# Patient Record
Sex: FEMALE | Race: OTHER | Employment: FULL TIME | URBAN - METROPOLITAN AREA
[De-identification: names, ages, dates, MRNs, and addresses within clinical notes are randomized per-mention and may not be internally consistent; named-entity substitution may affect disease eponyms.]

---

## 2017-03-21 ENCOUNTER — OFFICE VISIT (OUTPATIENT)
Dept: FAMILY MEDICINE CLINIC | Facility: CLINIC | Age: 32
End: 2017-03-21

## 2017-03-21 VITALS
HEART RATE: 88 BPM | OXYGEN SATURATION: 99 % | RESPIRATION RATE: 18 BRPM | DIASTOLIC BLOOD PRESSURE: 80 MMHG | SYSTOLIC BLOOD PRESSURE: 128 MMHG | TEMPERATURE: 100 F

## 2017-03-21 DIAGNOSIS — S01.20XA: Primary | ICD-10-CM

## 2017-03-21 PROCEDURE — 99213 OFFICE O/P EST LOW 20 MIN: CPT

## 2017-03-21 RX ORDER — SULFAMETHOXAZOLE AND TRIMETHOPRIM 800; 160 MG/1; MG/1
TABLET ORAL
Qty: 20 TABLET | Refills: 0 | Status: SHIPPED | OUTPATIENT
Start: 2017-03-21 | End: 2017-12-13 | Stop reason: ALTCHOICE

## 2017-03-22 ENCOUNTER — TELEPHONE (OUTPATIENT)
Dept: FAMILY MEDICINE CLINIC | Facility: CLINIC | Age: 32
End: 2017-03-22

## 2017-03-22 ENCOUNTER — TELEPHONE (OUTPATIENT)
Dept: INTERNAL MEDICINE CLINIC | Facility: CLINIC | Age: 32
End: 2017-03-22

## 2017-03-22 LAB
MRSA NASAL: POSITIVE
STAPH A BY PCR: POSITIVE

## 2017-03-22 NOTE — PROGRESS NOTES
Eryn Lofton is a 34year old female. CHIEF COMPLAINT:   Patient presents with:  Pharyngitis  Mouth Cold Sores (respiratory/integumentary)      HPI:   Patient presents with 2-3 day history of sore throat.   Patient reports the following associated symptoms: exudate. Mild post pharyng redness  NECK: supple, non-tender  LUNGS: clear to auscultation bilaterally, no wheezes or rhonchi. Breathing is non labored.   CARDIO: RRR without murmur  EXTREMITIES: no cyanosis, clubbing or edema  LYMPH: soft non tender upper

## 2017-03-22 NOTE — PATIENT INSTRUCTIONS
Use Bactroban inside your nose 3 times a day for 2 weeks. Trim nails short and shower with Hibiclens daily for next 3 days, then 3 times a weeks. Wash bed linens 2x a week for next 2 weeks. Run them through dryer.   We will treat presumptively for MRSA f

## 2017-03-22 NOTE — TELEPHONE ENCOUNTER
Contacted Nannette and related + MRSA results and to follow-up with PCM in a week. Understanding verbalized.

## 2017-03-22 NOTE — TELEPHONE ENCOUNTER
Called concerning wound culture. + MSRA. LVM to call for results. Nannette placed on Bactrim DS on visit.

## 2017-03-29 ENCOUNTER — OFFICE VISIT (OUTPATIENT)
Dept: INTERNAL MEDICINE CLINIC | Facility: CLINIC | Age: 32
End: 2017-03-29

## 2017-03-29 VITALS
RESPIRATION RATE: 18 BRPM | SYSTOLIC BLOOD PRESSURE: 110 MMHG | WEIGHT: 223.19 LBS | TEMPERATURE: 98 F | BODY MASS INDEX: 39.55 KG/M2 | HEART RATE: 74 BPM | DIASTOLIC BLOOD PRESSURE: 74 MMHG | HEIGHT: 63 IN

## 2017-03-29 DIAGNOSIS — J06.9 ACUTE URI: Primary | ICD-10-CM

## 2017-03-29 PROCEDURE — 99212 OFFICE O/P EST SF 10 MIN: CPT | Performed by: INTERNAL MEDICINE

## 2017-03-29 PROCEDURE — 99213 OFFICE O/P EST LOW 20 MIN: CPT | Performed by: INTERNAL MEDICINE

## 2017-03-29 NOTE — PROGRESS NOTES
Irena Xie is a 34year old female. Patient presents with: Other: PATIENT F/U FOR WOUND NASAL CAVITY POSITIVE MRSA. HPI:   Ms. Salud Collins presents this morning for Immediate Care follow-up. She went to Immediate Care on March 21 with a sore throat.   Hoa appearing well and breathing comfortably in no distress  /74 mmHg  Pulse 74  Temp(Src) 97.8 °F (36.6 °C) (Oral)  Resp 18  Ht 5' 3\" (1.6 m)  Wt 223 lb 3.2 oz (101.243 kg)  BMI 39.55 kg/m2  LMP 03/19/2017  HEENT: Anicteric, conjunctiva pink, canals an

## 2017-12-13 ENCOUNTER — OFFICE VISIT (OUTPATIENT)
Dept: FAMILY MEDICINE CLINIC | Facility: CLINIC | Age: 32
End: 2017-12-13

## 2017-12-13 VITALS
TEMPERATURE: 99 F | RESPIRATION RATE: 16 BRPM | OXYGEN SATURATION: 98 % | SYSTOLIC BLOOD PRESSURE: 122 MMHG | HEART RATE: 86 BPM | DIASTOLIC BLOOD PRESSURE: 78 MMHG

## 2017-12-13 DIAGNOSIS — H65.02 ACUTE SEROUS OTITIS MEDIA OF LEFT EAR, RECURRENCE NOT SPECIFIED: ICD-10-CM

## 2017-12-13 DIAGNOSIS — R59.0 CERVICAL LYMPHADENOPATHY: Primary | ICD-10-CM

## 2017-12-13 PROCEDURE — 99213 OFFICE O/P EST LOW 20 MIN: CPT | Performed by: NURSE PRACTITIONER

## 2017-12-13 RX ORDER — AMOXICILLIN AND CLAVULANATE POTASSIUM 875; 125 MG/1; MG/1
1 TABLET, FILM COATED ORAL 2 TIMES DAILY
Qty: 14 TABLET | Refills: 0 | Status: SHIPPED | OUTPATIENT
Start: 2017-12-13 | End: 2017-12-20

## 2017-12-13 NOTE — PATIENT INSTRUCTIONS
Lymphadenopathy  Lymphadenopathy is swelling of the lymph nodes. Lymph nodes are small, bean-shaped glands around the body. What are lymph nodes? Lymph nodes are part of your immune system.  These glands are found in your neck, over your clavicle, armpi You may also have symptoms from an infection causing the swollen glands. These symptoms may include fever, sore throat, body aches, or cough. Diagnosing lymphadenopathy  Your healthcare provider will ask about your health history and symptoms.  He or she w © 2904-9517 [employer] [address]. [address] [facility], [facility] [place]. All rights reserved. This information is not intended as a substitute for professional medical care. Always follow your healthcare professional's instructions.

## 2017-12-13 NOTE — PROGRESS NOTES
CHIEF COMPLAINT:   Patient presents with:  URI: ear pain, swollen glands      HPI:   Aris Iglesias is a 34year old female who presents for lymphnode swelling under the neck x3 days, this morning painful left-side lymphnode, left ear pain, and sore throat, n EARS: TM's intact, non- bulging,  No retraction, + effusion LEFT ear, bony landmarks present, EACs clear, tender to LEFT pinna on exam, no external lesions noted  NOSE: Nostrils patent, minimal clear nasal discharge, nasal mucosa mildly inflamed and edemat Lymph nodes are part of your immune system. These glands are found in your neck, over your clavicle, armpits, groin, chest, and abdomen. They act as filters for lymph fluid as it flows through your body.  Lymph fluid contains white blood cells (lymphocytes) Diagnosing lymphadenopathy  Your healthcare provider will ask about your health history and symptoms. He or she will give you a physical exam and check the areas where lymph nodes are enlarged.  Your healthcare provider will check the size and location of t The patient indicates understanding of these issues and agrees to the plan.

## 2018-01-06 DIAGNOSIS — Z30.41 ENCOUNTER FOR SURVEILLANCE OF CONTRACEPTIVE PILLS: ICD-10-CM

## 2018-01-08 RX ORDER — NORGESTIMATE AND ETHINYL ESTRADIOL 0.25-0.035
KIT ORAL
Qty: 28 TABLET | Refills: 0 | Status: SHIPPED | OUTPATIENT
Start: 2018-01-08 | End: 2018-02-06

## 2018-01-08 NOTE — TELEPHONE ENCOUNTER
One ocp pack sent to pt's pharmacy to cover pt to next annual on 2/6/18. Last annual 12/8/16.  6/2015 neg Pap and HPV.

## 2018-02-06 ENCOUNTER — OFFICE VISIT (OUTPATIENT)
Dept: OBGYN CLINIC | Facility: CLINIC | Age: 33
End: 2018-02-06

## 2018-02-06 VITALS
HEIGHT: 62 IN | WEIGHT: 242 LBS | DIASTOLIC BLOOD PRESSURE: 77 MMHG | HEART RATE: 69 BPM | SYSTOLIC BLOOD PRESSURE: 121 MMHG | BODY MASS INDEX: 44.53 KG/M2

## 2018-02-06 DIAGNOSIS — Z01.419 ENCOUNTER FOR GYNECOLOGICAL EXAMINATION WITHOUT ABNORMAL FINDING: Primary | ICD-10-CM

## 2018-02-06 DIAGNOSIS — Z30.41 ENCOUNTER FOR SURVEILLANCE OF CONTRACEPTIVE PILLS: ICD-10-CM

## 2018-02-06 PROCEDURE — 99395 PREV VISIT EST AGE 18-39: CPT | Performed by: OBSTETRICS & GYNECOLOGY

## 2018-02-06 RX ORDER — NORGESTIMATE AND ETHINYL ESTRADIOL 0.25-0.035
1 KIT ORAL
Qty: 28 TABLET | Refills: 12 | Status: SHIPPED | OUTPATIENT
Start: 2018-02-06 | End: 2019-02-12

## 2018-02-07 NOTE — PROGRESS NOTES
Well Woman Exam    HPI:  The patient is a 29yo female who presents for annual exam. She has no complaints. She is happy with OCPs. She denies history of abnormal pap smear.     Reviewed medical and surgical history below   OBSTETRICS HISTORY:  Obstetric His 12    ALLERGIES:  No Known Allergies      Review of Systems:  Constitutional:  Denies fevers and chills   Cardiovascular:  denies chest pain or palpitations  Respiratory:  denies shortness of breath  Gastrointestinal:  denies nausea, vomiting diarrhea or c ACOG encourages shared decision making with the patient and together we reach appropriate screening intervals for the individual  2. Reviewed breast self awareness   4.  Follow up in 1 year

## 2018-03-28 ENCOUNTER — NURSE TRIAGE (OUTPATIENT)
Dept: OTHER | Age: 33
End: 2018-03-28

## 2018-03-28 NOTE — TELEPHONE ENCOUNTER
Action Requested: Summary for Provider     []  Critical Lab, Recommendations Needed  [] Need Additional Advice  []   FYI    []   Need Orders  [] Need Medications Sent to Pharmacy  []  Other     SUMMARY: Spoke with patient who reports she has been having ri

## 2018-03-29 ENCOUNTER — OFFICE VISIT (OUTPATIENT)
Dept: INTERNAL MEDICINE CLINIC | Facility: CLINIC | Age: 33
End: 2018-03-29

## 2018-03-29 VITALS
TEMPERATURE: 98 F | DIASTOLIC BLOOD PRESSURE: 83 MMHG | HEART RATE: 78 BPM | WEIGHT: 243.31 LBS | HEIGHT: 63 IN | BODY MASS INDEX: 43.11 KG/M2 | SYSTOLIC BLOOD PRESSURE: 127 MMHG

## 2018-03-29 DIAGNOSIS — R07.89 RIGHT-SIDED CHEST WALL PAIN: Primary | ICD-10-CM

## 2018-03-29 PROCEDURE — 99213 OFFICE O/P EST LOW 20 MIN: CPT | Performed by: INTERNAL MEDICINE

## 2018-03-29 PROCEDURE — 99212 OFFICE O/P EST SF 10 MIN: CPT | Performed by: INTERNAL MEDICINE

## 2018-03-29 RX ORDER — NAPROXEN 500 MG/1
500 TABLET ORAL 2 TIMES DAILY WITH MEALS
Qty: 28 TABLET | Refills: 0 | Status: SHIPPED | OUTPATIENT
Start: 2018-03-29 | End: 2018-04-12

## 2018-03-29 NOTE — PROGRESS NOTES
Talha Ocampo is a 28year old female. Patient presents with:  Pain: right side pain  Arm Pain: right arm  Hair/Scalp Problem      HPI:     HPI  Patient is here for evaluation of the pain on the right chest wall. It started almost 4 weeks ago.   It is a con Smokeless tobacco: Never Used                      Alcohol use: Yes           2.0 oz/week     Glasses of wine: 4 per week     Comment: Per pt socially drink on weekends       REVIEW OF SYSTEMS:   ROS  GENERAL HEALTH: No fevers, chills, sweats, fatigue. Primary        Diagnoses and all orders for this visit:    Right-sided chest wall pain  No other muscle group involvement. No trigger point tenderness. Most likely localized muscle inflammation. Giving naproxen 500 mg twice daily for 7 days.   Avoid inte

## 2018-05-21 ENCOUNTER — TELEPHONE (OUTPATIENT)
Dept: INTERNAL MEDICINE CLINIC | Facility: CLINIC | Age: 33
End: 2018-05-21

## 2018-05-21 ENCOUNTER — OFFICE VISIT (OUTPATIENT)
Dept: INTERNAL MEDICINE CLINIC | Facility: CLINIC | Age: 33
End: 2018-05-21

## 2018-05-21 VITALS
DIASTOLIC BLOOD PRESSURE: 80 MMHG | TEMPERATURE: 98 F | SYSTOLIC BLOOD PRESSURE: 126 MMHG | WEIGHT: 240.5 LBS | HEIGHT: 63 IN | BODY MASS INDEX: 42.61 KG/M2

## 2018-05-21 DIAGNOSIS — K80.21 CALCULUS OF GALLBLADDER WITH BILIARY OBSTRUCTION BUT WITHOUT CHOLECYSTITIS: Primary | ICD-10-CM

## 2018-05-21 PROCEDURE — 99214 OFFICE O/P EST MOD 30 MIN: CPT | Performed by: INTERNAL MEDICINE

## 2018-05-21 PROCEDURE — 99212 OFFICE O/P EST SF 10 MIN: CPT | Performed by: INTERNAL MEDICINE

## 2018-05-21 NOTE — PROGRESS NOTES
Clare Chappell is a 28year old female. Patient presents with:  ER F/U: pt went to [facility] and diagnosed with gall stones      HPI:     HPI   Reynaldo Castaneda is here today for ER follow up oon 5/19 from [facility] for cholelithiasis.  CT scan evidenced cholelithi weekends       REVIEW OF SYSTEMS:   Review of Systems   Constitutional: Negative for chills, fever and malaise/fatigue. Eyes: Negative. Respiratory: Negative. Cardiovascular: Negative.     Gastrointestinal: Negative for abdominal pain, blood in stoo discuss with the surgeon about elective laparoscopic cholecystectomy for symptomatic gallbladder.  -     US GALLBLADDER (BDG=[id number]); Future  -     SURGERY - INTERNAL            The patient indicates understanding of these issues and agrees to the plan.

## 2018-05-21 NOTE — TELEPHONE ENCOUNTER
Per Dr. Bennett Zee, called patient to offer appt for Wednesday 05/23/18 at 12:40. Patient stated that she has already been seen this morning for immediate issue that she had concerns about. She will keep her June appt with Dr. Bennett Zee.

## 2018-05-21 NOTE — PATIENT INSTRUCTIONS
Gallstones with Biliary Colic    You have abdominal pain due to irritation and spasm of the gallbladder. This is called biliary colic. The gallbladder is a small sac under the liver, which stores and releases a fluid that aids in the digestion of fat. A © 1966-1451 [employer] [address]. [address] [facility], [employer] [place]. All rights reserved. This information is not intended as a substitute for professional medical care. Always follow your healthcare professional's instructions.         [place] · Fever of 100.4ºF (38ºC) or higher, or as directed by your healthcare provider  · Very dark urine, light colored stools, or yellow color of the skin or eyes  · Chest, arm, back, neck or jaw pain  Date Last Reviewed: 6/18/2015  © 1318-0591 [employer]

## 2018-05-23 ENCOUNTER — OFFICE VISIT (OUTPATIENT)
Dept: SURGERY | Facility: CLINIC | Age: 33
End: 2018-05-23

## 2018-05-23 VITALS — BODY MASS INDEX: 42.52 KG/M2 | HEIGHT: 63 IN | WEIGHT: 240 LBS

## 2018-05-23 DIAGNOSIS — K80.20 CALCULUS OF GALLBLADDER WITHOUT CHOLECYSTITIS WITHOUT OBSTRUCTION: Primary | ICD-10-CM

## 2018-05-23 PROCEDURE — 99212 OFFICE O/P EST SF 10 MIN: CPT | Performed by: SURGERY

## 2018-05-23 PROCEDURE — 99204 OFFICE O/P NEW MOD 45 MIN: CPT | Performed by: SURGERY

## 2018-05-23 RX ORDER — TRAMADOL HYDROCHLORIDE 50 MG/1
50 TABLET ORAL
COMMUNITY
Start: 2018-05-20 | End: 2018-05-23

## 2018-05-23 NOTE — PROGRESS NOTES
HPI:    Patient ID: Italia Hodges is a 28year old female presenting with Patient presents with:  Gallbladder: Patient referred by PCP Dr. Masoud Reyes for gallstones found at [facility] via CT scan, ER on Saturday 05/19, experienced epigastric pain along with na nausea, vomiting and abdominal pain. Endocrine: Negative. Genitourinary: Negative. Musculoskeletal: Negative. Skin: Negative. Allergic/Immunologic: Negative. Neurological: Negative. Hematological: Does not bruise/bleed easily.    Psychia

## 2018-05-25 ENCOUNTER — TELEPHONE (OUTPATIENT)
Dept: ADMINISTRATIVE | Age: 33
End: 2018-05-25

## 2018-05-25 NOTE — TELEPHONE ENCOUNTER
Dropped off by pt @[facility] Disability form for Dr. Rajni Roque + FCR+ Signed release, paid $25. Logged for processing.  NK

## 2018-05-29 NOTE — TELEPHONE ENCOUNTER
Dr. Loraine Khan,    Please sign off on form:  -Highlight the patient and hit \"Chart\" button. -In Chart Review, w/in the Encounter tab - click 1 time on the Telephone call encounter for 05/25/18.  Scroll down the telephone encounter.  -Click \"scan on\" blue Hype

## 2018-05-30 NOTE — TELEPHONE ENCOUNTER
Form faxed and mailed to [facility], copy mailed to ptshayla for pt to let her know it is completed.  NK

## 2018-05-31 RX ORDER — OXICONAZOLE NITRATE 10 MG/G
CREAM TOPICAL 2 TIMES DAILY
COMMUNITY
End: 2018-08-25 | Stop reason: ALTCHOICE

## 2018-06-07 ENCOUNTER — ANESTHESIA EVENT (OUTPATIENT)
Dept: SURGERY | Facility: HOSPITAL | Age: 33
End: 2018-06-07
Payer: COMMERCIAL

## 2018-06-07 ENCOUNTER — ANESTHESIA (OUTPATIENT)
Dept: SURGERY | Facility: HOSPITAL | Age: 33
End: 2018-06-07
Payer: COMMERCIAL

## 2018-06-07 ENCOUNTER — SURGERY (OUTPATIENT)
Age: 33
End: 2018-06-07

## 2018-06-07 ENCOUNTER — HOSPITAL ENCOUNTER (OUTPATIENT)
Facility: HOSPITAL | Age: 33
Setting detail: HOSPITAL OUTPATIENT SURGERY
Discharge: HOME | End: 2018-06-07
Attending: SURGERY | Admitting: SURGERY
Payer: COMMERCIAL

## 2018-06-07 VITALS
HEIGHT: 63 IN | RESPIRATION RATE: 16 BRPM | HEART RATE: 68 BPM | DIASTOLIC BLOOD PRESSURE: 57 MMHG | SYSTOLIC BLOOD PRESSURE: 120 MMHG | WEIGHT: 235 LBS | OXYGEN SATURATION: 96 % | BODY MASS INDEX: 41.64 KG/M2 | TEMPERATURE: 98 F

## 2018-06-07 DIAGNOSIS — K80.20 CALCULUS OF GALLBLADDER WITHOUT CHOLECYSTITIS WITHOUT OBSTRUCTION: ICD-10-CM

## 2018-06-07 DIAGNOSIS — K80.20 GALLSTONES: ICD-10-CM

## 2018-06-07 PROCEDURE — 0FT44ZZ RESECTION OF GALLBLADDER, PERCUTANEOUS ENDOSCOPIC APPROACH: ICD-10-PCS | Performed by: SURGERY

## 2018-06-07 PROCEDURE — 47562 LAPAROSCOPIC CHOLECYSTECTOMY: CPT | Performed by: SURGERY

## 2018-06-07 RX ORDER — MIDAZOLAM HYDROCHLORIDE 1 MG/ML
INJECTION INTRAMUSCULAR; INTRAVENOUS AS NEEDED
Status: DISCONTINUED | OUTPATIENT
Start: 2018-06-07 | End: 2018-06-07 | Stop reason: SURG

## 2018-06-07 RX ORDER — HYDROCODONE BITARTRATE AND ACETAMINOPHEN 10; 325 MG/1; MG/1
1 TABLET ORAL ONCE AS NEEDED
Status: COMPLETED | OUTPATIENT
Start: 2018-06-07 | End: 2018-06-07

## 2018-06-07 RX ORDER — GLYCOPYRROLATE 0.2 MG/ML
INJECTION INTRAMUSCULAR; INTRAVENOUS AS NEEDED
Status: DISCONTINUED | OUTPATIENT
Start: 2018-06-07 | End: 2018-06-07 | Stop reason: SURG

## 2018-06-07 RX ORDER — ONDANSETRON 2 MG/ML
INJECTION INTRAMUSCULAR; INTRAVENOUS AS NEEDED
Status: DISCONTINUED | OUTPATIENT
Start: 2018-06-07 | End: 2018-06-07 | Stop reason: SURG

## 2018-06-07 RX ORDER — HYDROMORPHONE HYDROCHLORIDE 1 MG/ML
0.2 INJECTION, SOLUTION INTRAMUSCULAR; INTRAVENOUS; SUBCUTANEOUS EVERY 5 MIN PRN
Status: DISCONTINUED | OUTPATIENT
Start: 2018-06-07 | End: 2018-06-07

## 2018-06-07 RX ORDER — HYDROMORPHONE HYDROCHLORIDE 1 MG/ML
0.6 INJECTION, SOLUTION INTRAMUSCULAR; INTRAVENOUS; SUBCUTANEOUS EVERY 5 MIN PRN
Status: DISCONTINUED | OUTPATIENT
Start: 2018-06-07 | End: 2018-06-07

## 2018-06-07 RX ORDER — DOCUSATE SODIUM 100 MG/1
100 CAPSULE, LIQUID FILLED ORAL 3 TIMES DAILY
Qty: 30 CAPSULE | Refills: 2 | Status: SHIPPED | OUTPATIENT
Start: 2018-06-07 | End: 2018-06-26

## 2018-06-07 RX ORDER — KETOROLAC TROMETHAMINE 30 MG/ML
INJECTION, SOLUTION INTRAMUSCULAR; INTRAVENOUS AS NEEDED
Status: DISCONTINUED | OUTPATIENT
Start: 2018-06-07 | End: 2018-06-07 | Stop reason: SURG

## 2018-06-07 RX ORDER — HYDROCODONE BITARTRATE AND ACETAMINOPHEN 5; 325 MG/1; MG/1
2 TABLET ORAL AS NEEDED
Status: DISCONTINUED | OUTPATIENT
Start: 2018-06-07 | End: 2018-06-07

## 2018-06-07 RX ORDER — SODIUM CHLORIDE, SODIUM LACTATE, POTASSIUM CHLORIDE, CALCIUM CHLORIDE 600; 310; 30; 20 MG/100ML; MG/100ML; MG/100ML; MG/100ML
INJECTION, SOLUTION INTRAVENOUS CONTINUOUS
Status: DISCONTINUED | OUTPATIENT
Start: 2018-06-07 | End: 2018-06-07

## 2018-06-07 RX ORDER — SCOLOPAMINE TRANSDERMAL SYSTEM 1 MG/1
PATCH, EXTENDED RELEASE TRANSDERMAL AS NEEDED
Status: DISCONTINUED | OUTPATIENT
Start: 2018-06-07 | End: 2018-06-07 | Stop reason: SURG

## 2018-06-07 RX ORDER — HALOPERIDOL 5 MG/ML
0.25 INJECTION INTRAMUSCULAR ONCE AS NEEDED
Status: DISCONTINUED | OUTPATIENT
Start: 2018-06-07 | End: 2018-06-07

## 2018-06-07 RX ORDER — ONDANSETRON 2 MG/ML
4 INJECTION INTRAMUSCULAR; INTRAVENOUS ONCE AS NEEDED
Status: DISCONTINUED | OUTPATIENT
Start: 2018-06-07 | End: 2018-06-07

## 2018-06-07 RX ORDER — BUPIVACAINE HYDROCHLORIDE 5 MG/ML
INJECTION, SOLUTION EPIDURAL; INTRACAUDAL AS NEEDED
Status: DISCONTINUED | OUTPATIENT
Start: 2018-06-07 | End: 2018-06-07 | Stop reason: HOSPADM

## 2018-06-07 RX ORDER — HYDROCODONE BITARTRATE AND ACETAMINOPHEN 10; 325 MG/1; MG/1
1 TABLET ORAL EVERY 4 HOURS PRN
Qty: 30 TABLET | Refills: 0 | Status: SHIPPED | OUTPATIENT
Start: 2018-06-07 | End: 2018-06-26

## 2018-06-07 RX ORDER — FAMOTIDINE 20 MG/1
20 TABLET ORAL ONCE
Status: DISCONTINUED | OUTPATIENT
Start: 2018-06-07 | End: 2018-06-07 | Stop reason: HOSPADM

## 2018-06-07 RX ORDER — ACETAMINOPHEN 500 MG
1000 TABLET ORAL ONCE
Status: DISCONTINUED | OUTPATIENT
Start: 2018-06-07 | End: 2018-06-07 | Stop reason: HOSPADM

## 2018-06-07 RX ORDER — HYDROMORPHONE HYDROCHLORIDE 1 MG/ML
0.4 INJECTION, SOLUTION INTRAMUSCULAR; INTRAVENOUS; SUBCUTANEOUS EVERY 5 MIN PRN
Status: DISCONTINUED | OUTPATIENT
Start: 2018-06-07 | End: 2018-06-07

## 2018-06-07 RX ORDER — CEFAZOLIN SODIUM/WATER 2 G/20 ML
2 SYRINGE (ML) INTRAVENOUS ONCE
Status: COMPLETED | OUTPATIENT
Start: 2018-06-07 | End: 2018-06-07

## 2018-06-07 RX ORDER — ROCURONIUM BROMIDE 10 MG/ML
INJECTION, SOLUTION INTRAVENOUS AS NEEDED
Status: DISCONTINUED | OUTPATIENT
Start: 2018-06-07 | End: 2018-06-07 | Stop reason: SURG

## 2018-06-07 RX ORDER — HYDROCODONE BITARTRATE AND ACETAMINOPHEN 5; 325 MG/1; MG/1
1 TABLET ORAL AS NEEDED
Status: DISCONTINUED | OUTPATIENT
Start: 2018-06-07 | End: 2018-06-07

## 2018-06-07 RX ORDER — NALOXONE HYDROCHLORIDE 0.4 MG/ML
80 INJECTION, SOLUTION INTRAMUSCULAR; INTRAVENOUS; SUBCUTANEOUS AS NEEDED
Status: DISCONTINUED | OUTPATIENT
Start: 2018-06-07 | End: 2018-06-07

## 2018-06-07 RX ORDER — METOCLOPRAMIDE 10 MG/1
10 TABLET ORAL ONCE
Status: DISCONTINUED | OUTPATIENT
Start: 2018-06-07 | End: 2018-06-07 | Stop reason: HOSPADM

## 2018-06-07 RX ORDER — DEXAMETHASONE SODIUM PHOSPHATE 4 MG/ML
VIAL (ML) INJECTION AS NEEDED
Status: DISCONTINUED | OUTPATIENT
Start: 2018-06-07 | End: 2018-06-07 | Stop reason: SURG

## 2018-06-07 RX ORDER — LIDOCAINE HYDROCHLORIDE 10 MG/ML
INJECTION, SOLUTION EPIDURAL; INFILTRATION; INTRACAUDAL; PERINEURAL AS NEEDED
Status: DISCONTINUED | OUTPATIENT
Start: 2018-06-07 | End: 2018-06-07 | Stop reason: SURG

## 2018-06-07 RX ORDER — NEOSTIGMINE METHYLSULFATE 0.5 MG/ML
INJECTION INTRAVENOUS AS NEEDED
Status: DISCONTINUED | OUTPATIENT
Start: 2018-06-07 | End: 2018-06-07 | Stop reason: SURG

## 2018-06-07 RX ADMIN — GLYCOPYRROLATE 0.6 MG: 0.2 INJECTION INTRAMUSCULAR; INTRAVENOUS at 15:56:00

## 2018-06-07 RX ADMIN — LIDOCAINE HYDROCHLORIDE 50 MG: 10 INJECTION, SOLUTION EPIDURAL; INFILTRATION; INTRACAUDAL; PERINEURAL at 14:57:00

## 2018-06-07 RX ADMIN — MIDAZOLAM HYDROCHLORIDE 2 MG: 1 INJECTION INTRAMUSCULAR; INTRAVENOUS at 14:54:00

## 2018-06-07 RX ADMIN — ROCURONIUM BROMIDE 10 MG: 10 INJECTION, SOLUTION INTRAVENOUS at 15:15:00

## 2018-06-07 RX ADMIN — SCOLOPAMINE TRANSDERMAL SYSTEM 1 PATCH: 1 PATCH, EXTENDED RELEASE TRANSDERMAL at 14:50:00

## 2018-06-07 RX ADMIN — DEXAMETHASONE SODIUM PHOSPHATE 4 MG: 4 MG/ML VIAL (ML) INJECTION at 15:15:00

## 2018-06-07 RX ADMIN — SODIUM CHLORIDE, SODIUM LACTATE, POTASSIUM CHLORIDE, CALCIUM CHLORIDE: 600; 310; 30; 20 INJECTION, SOLUTION INTRAVENOUS at 15:38:00

## 2018-06-07 RX ADMIN — NEOSTIGMINE METHYLSULFATE 3 MG: 0.5 INJECTION INTRAVENOUS at 15:56:00

## 2018-06-07 RX ADMIN — CEFAZOLIN SODIUM/WATER 2 G: 2 G/20 ML SYRINGE (ML) INTRAVENOUS at 15:08:00

## 2018-06-07 RX ADMIN — KETOROLAC TROMETHAMINE 30 MG: 30 INJECTION, SOLUTION INTRAMUSCULAR; INTRAVENOUS at 15:56:00

## 2018-06-07 RX ADMIN — ONDANSETRON 4 MG: 2 INJECTION INTRAMUSCULAR; INTRAVENOUS at 15:15:00

## 2018-06-07 RX ADMIN — SODIUM CHLORIDE, SODIUM LACTATE, POTASSIUM CHLORIDE, CALCIUM CHLORIDE: 600; 310; 30; 20 INJECTION, SOLUTION INTRAVENOUS at 14:52:00

## 2018-06-07 RX ADMIN — ROCURONIUM BROMIDE 40 MG: 10 INJECTION, SOLUTION INTRAVENOUS at 14:58:00

## 2018-06-07 NOTE — ANESTHESIA PREPROCEDURE EVALUATION
Anesthesia PreOp Note    HPI:     Sylvain Reyes is a 28year old female who presents for preoperative consultation requested by: Nicci Malave MD    Date of Surgery: 6/7/2018    Procedure(s):  LAPAROSCOPIC CHOLECYSTECTOMY  Indication: Gallstones [K80.20]    R famoTIDine (PEPCID) tab 20 mg 20 mg Oral Once Laura Running, MD   Metoclopramide HCl (REGLAN) tab 10 mg 10 mg Oral Once Laura Running, MD   ceFAZolin sodium (ANCEF/KEFZOL) 2 GM/20ML premix IV syringe 2 g 2 g Intravenous Once Laura Running, MD     No current Epic-order Neck ROM: full  Dental          Pulmonary - negative ROS and normal exam   Cardiovascular - normal exam  Exercise tolerance: good    Neuro/Psych - negative ROS     GI/Hepatic/Renal    (-) GERD    Endo/Other - negative ROS   Abdominal   (+) obese,

## 2018-06-07 NOTE — ANESTHESIA POSTPROCEDURE EVALUATION
Patient: Chelsea Villavicencio    Procedure Summary     Date:  06/07/18 Room / Location:  [facility] OR 03 / [facility] OR    Anesthesia Start:  1985 Anesthesia Stop:  1484    Procedure:  LAPAROSCOPIC CHOLECYSTECTOMY (N/A Abdomen) Diagnosis:       Gallstones      (Gal

## 2018-06-07 NOTE — OPERATIVE REPORT
Operative Report    Patient Name:  Leonel Osborne  MR:  [id number]  :  3/17/1985  DOS:  17    Preop Dx:  Gallstones [K80.20]  Postop Dx:  Chronic cholecystitis  Procedure:  Laparoscopic cholecystectomy  Surgeon:  Shanel Lord MD  Surgical Assistant.: duct and artery, are seen entering the infundibulum, thus obtaining the so called \"critical view of safety\". The cystic duct and artery were clipped and divided.   The gallbladder was detached from the liver using hook cautery and delivered from the abdo

## 2018-06-08 ENCOUNTER — TELEPHONE (OUTPATIENT)
Dept: OBGYN CLINIC | Facility: CLINIC | Age: 33
End: 2018-06-08

## 2018-06-08 NOTE — H&P
HP Update:    Pt seen and examined in the preoperative holding area. No significant clinical update noted. Plan to proceed with a lap tyrone.       Blanche Schwab MD

## 2018-06-08 NOTE — TELEPHONE ENCOUNTER
Pt had gall bladder removed yesterday and had a CT scan that showed an ovarian cyst. She wants to f/u with [facility]. Advised her to wait until she has had her post op appt so she has time to heal first. Pt agrees. She accepted an appt end of June with Natasha Mortimer.  We montserrat

## 2018-06-08 NOTE — TELEPHONE ENCOUNTER
From [facility] via fax - request for records update, op report faxed to Ins, scanned, mailed to pt, no addl charge.  NK

## 2018-06-11 ENCOUNTER — TELEPHONE (OUTPATIENT)
Dept: SURGERY | Facility: CLINIC | Age: 33
End: 2018-06-11

## 2018-06-11 NOTE — TELEPHONE ENCOUNTER
Patient would like to know when the post op pain will start to subside. States the larger incision is still pretty painful -- rates pain at 4/5 out of 10. Wants to know if this is normal. Please call. Thank you.

## 2018-06-11 NOTE — TELEPHONE ENCOUNTER
Contacted patient. S/P Laparoscopic cholecystectomy on 06/07/2018. Patient c/o pain, discomfort, and tightness to umbilical incision 4/5 out of 10. Was taking norco until yesterday and has not taken anything this AM. Denies fever, no drainage or bleeding.

## 2018-06-22 ENCOUNTER — OFFICE VISIT (OUTPATIENT)
Dept: SURGERY | Facility: CLINIC | Age: 33
End: 2018-06-22

## 2018-06-22 DIAGNOSIS — Z90.49 S/P LAPAROSCOPIC CHOLECYSTECTOMY: Primary | ICD-10-CM

## 2018-06-22 PROCEDURE — 99212 OFFICE O/P EST SF 10 MIN: CPT | Performed by: SURGERY

## 2018-06-22 PROCEDURE — 99024 POSTOP FOLLOW-UP VISIT: CPT | Performed by: SURGERY

## 2018-06-22 NOTE — PROGRESS NOTES
S:  Nova Grimes is a 28year old female sp Lap Opal  Doing well, tolerating a general diet with normal bowel habits      O:  LMP 05/23/2018   GEN:  No acute distress  Abd:  Soft, NTND, incisions C/D/I    Path:  Reviewed w pt    Assessment   S/P laparosc

## 2018-06-26 ENCOUNTER — OFFICE VISIT (OUTPATIENT)
Dept: OBGYN CLINIC | Facility: CLINIC | Age: 33
End: 2018-06-26

## 2018-06-26 VITALS
BODY MASS INDEX: 42 KG/M2 | WEIGHT: 238.38 LBS | DIASTOLIC BLOOD PRESSURE: 74 MMHG | HEART RATE: 62 BPM | SYSTOLIC BLOOD PRESSURE: 113 MMHG

## 2018-06-26 DIAGNOSIS — Z87.42 HISTORY OF OVARIAN CYST: Primary | ICD-10-CM

## 2018-06-26 PROCEDURE — 99213 OFFICE O/P EST LOW 20 MIN: CPT | Performed by: OBSTETRICS & GYNECOLOGY

## 2018-06-26 NOTE — PROGRESS NOTES
Pa Boyce is a 28year old female [facility] Patient's last menstrual period was 06/20/2018. Patient presents with:  Gyn Problem: test result consult  Patient presents today for follow up [place] [facility] [place] showing left ovarian cyst (4cm). Norgestimate-Eth Estradiol (MONO-LINYAH) 0.25-35 MG-MCG Oral Tab, Take 1 tablet by mouth once daily.  (Patient taking differently: Take 1 tablet by mouth nightly.  ), Disp: 28 tablet, Rfl: 12  •  Oxiconazole Nitrate 1 % External Cream, Apply topically 2 (tw [address]

## 2018-06-27 ENCOUNTER — OFFICE VISIT (OUTPATIENT)
Dept: INTERNAL MEDICINE CLINIC | Facility: CLINIC | Age: 33
End: 2018-06-27

## 2018-06-27 VITALS
TEMPERATURE: 99 F | OXYGEN SATURATION: 99 % | HEART RATE: 71 BPM | BODY MASS INDEX: 42.24 KG/M2 | WEIGHT: 238.38 LBS | SYSTOLIC BLOOD PRESSURE: 124 MMHG | HEIGHT: 63 IN | DIASTOLIC BLOOD PRESSURE: 76 MMHG

## 2018-06-27 DIAGNOSIS — Z90.49 S/P LAPAROSCOPIC CHOLECYSTECTOMY: ICD-10-CM

## 2018-06-27 DIAGNOSIS — Z09 HOSPITAL DISCHARGE FOLLOW-UP: ICD-10-CM

## 2018-06-27 DIAGNOSIS — E66.01 MORBID OBESITY (HCC): ICD-10-CM

## 2018-06-27 DIAGNOSIS — Z51.81 THERAPEUTIC DRUG MONITORING: Primary | ICD-10-CM

## 2018-06-27 PROCEDURE — 99215 OFFICE O/P EST HI 40 MIN: CPT | Performed by: INTERNAL MEDICINE

## 2018-06-27 RX ORDER — PHENTERMINE HYDROCHLORIDE 15 MG/1
15 CAPSULE ORAL EVERY MORNING
Qty: 30 CAPSULE | Refills: 0 | Status: SHIPPED | OUTPATIENT
Start: 2018-06-27 | End: 2018-09-22 | Stop reason: ALTCHOICE

## 2018-06-27 NOTE — PATIENT INSTRUCTIONS
HERI Amayacome to [email or web address]. We are excited that you are committed to improving your health and have invited our practice to be part of your journey.  Our approach to the medical management of weight loss is similar to that of other chr water per day, add fiber ( benefiber) to the water to increase fullness, overcome constipation    · Eat slowly    · Do not drink your calories ( no regular pop, juice, high calorie coffee drinks, limit alcohol) Also stay away from artificially sweetened be should I use this medicine? Take this medicine by mouth with a glass of water. Follow the directions on the prescription label. The instructions for use may differ based on the product and dose you are taking. Avoid taking this medicine in the evening.  It it is almost time for your next dose, take only that dose. Do not take double or extra doses. Where should I keep my medicine? Keep out of the reach of children. This medicine can be abused. Keep your medicine in a safe place to protect it from theft.  Do weight. Do not increase or in any way change your dose without consulting your doctor. You may get drowsy or dizzy. Do not drive, use machinery, or do anything that needs mental alertness until you know how this medicine affects you.  Do not stand or sit u

## 2018-06-27 NOTE — PROGRESS NOTES
Mara Verdugo is a 28year old female. Chief complaint:  weight loss management and obesity related co morbidities, establish care and follow up on gall bladder surgery     HPI:     Mara Verdugo is a 28year old female new to our office today.   with PMH topically 2 (two) times daily. Disp:  Rfl:    Norgestimate-Eth Estradiol (MONO-LINYAH) 0.25-35 MG-MCG Oral Tab Take 1 tablet by mouth once daily.  (Patient taking differently: Take 1 tablet by mouth nightly.  ) Disp: 28 tablet Rfl: 12     Past Medical Histo lb  05/23/18 : 240 lb  05/21/18 : 240 lb 8 oz  03/29/18 : 243 lb 4.8 oz       GENERAL: well developed, well nourished,in no apparent distress  SKIN: no rashes,no suspicious lesions  HEENT: atraumatic, normocephalic,ears and throat are clear  NECK: supple,n 3 times per week ( goal is 150 min/week)  -dietician referral: no   -Labs as ordered        Follow up with PCP as scheduled. Follow up with Adalgisa Jamesch 1 mos      I spent 45 minutes face to face with the patient.   More than 50% of that time was spent c

## 2018-06-30 ENCOUNTER — LAB ENCOUNTER (OUTPATIENT)
Dept: LAB | Facility: HOSPITAL | Age: 33
End: 2018-06-30
Attending: INTERNAL MEDICINE
Payer: COMMERCIAL

## 2018-06-30 DIAGNOSIS — Z51.81 THERAPEUTIC DRUG MONITORING: ICD-10-CM

## 2018-06-30 LAB
ALBUMIN SERPL BCP-MCNC: 4 G/DL (ref 3.5–4.8)
ALBUMIN/GLOB SERPL: 1.4 {RATIO} (ref 1–2)
ALP SERPL-CCNC: 55 U/L (ref 32–100)
ALT SERPL-CCNC: 27 U/L (ref 14–54)
ANION GAP SERPL CALC-SCNC: 7 MMOL/L (ref 0–18)
AST SERPL-CCNC: 26 U/L (ref 15–41)
BASOPHILS # BLD: 0 K/UL (ref 0–0.2)
BASOPHILS NFR BLD: 1 %
BILIRUB SERPL-MCNC: 0.6 MG/DL (ref 0.3–1.2)
BUN SERPL-MCNC: 9 MG/DL (ref 8–20)
BUN/CREAT SERPL: 13.8 (ref 10–20)
CALCIUM SERPL-MCNC: 9 MG/DL (ref 8.5–10.5)
CHLORIDE SERPL-SCNC: 105 MMOL/L (ref 95–110)
CHOLEST SERPL-MCNC: 163 MG/DL (ref 110–200)
CO2 SERPL-SCNC: 24 MMOL/L (ref 22–32)
CREAT SERPL-MCNC: 0.65 MG/DL (ref 0.5–1.5)
EOSINOPHIL # BLD: 0 K/UL (ref 0–0.7)
EOSINOPHIL NFR BLD: 1 %
ERYTHROCYTE [DISTWIDTH] IN BLOOD BY AUTOMATED COUNT: 13.4 % (ref 11–15)
GLOBULIN PLAS-MCNC: 2.8 G/DL (ref 2.5–3.7)
GLUCOSE SERPL-MCNC: 83 MG/DL (ref 70–99)
HBA1C MFR BLD: 5.1 % (ref 4–6)
HCT VFR BLD AUTO: 40.6 % (ref 35–48)
HDLC SERPL-MCNC: 70 MG/DL
HGB BLD-MCNC: 13.7 G/DL (ref 12–16)
LDLC SERPL CALC-MCNC: 84 MG/DL (ref 0–99)
LYMPHOCYTES # BLD: 1.6 K/UL (ref 1–4)
LYMPHOCYTES NFR BLD: 35 %
MCH RBC QN AUTO: 31.1 PG (ref 27–32)
MCHC RBC AUTO-ENTMCNC: 33.7 G/DL (ref 32–37)
MCV RBC AUTO: 92.2 FL (ref 80–100)
MONOCYTES # BLD: 0.3 K/UL (ref 0–1)
MONOCYTES NFR BLD: 7 %
NEUTROPHILS # BLD AUTO: 2.6 K/UL (ref 1.8–7.7)
NEUTROPHILS NFR BLD: 57 %
NONHDLC SERPL-MCNC: 93 MG/DL
OSMOLALITY UR CALC.SUM OF ELEC: 280 MOSM/KG (ref 275–295)
PATIENT FASTING: YES
PLATELET # BLD AUTO: 224 K/UL (ref 140–400)
PMV BLD AUTO: 8.9 FL (ref 7.4–10.3)
POTASSIUM SERPL-SCNC: 4.6 MMOL/L (ref 3.3–5.1)
PROT SERPL-MCNC: 6.8 G/DL (ref 5.9–8.4)
RBC # BLD AUTO: 4.4 M/UL (ref 3.7–5.4)
SODIUM SERPL-SCNC: 136 MMOL/L (ref 136–144)
TRIGL SERPL-MCNC: 44 MG/DL (ref 1–149)
TSH SERPL-ACNC: 1.84 UIU/ML (ref 0.45–5.33)
VIT B12 SERPL-MCNC: 252 PG/ML (ref 181–914)
WBC # BLD AUTO: 4.6 K/UL (ref 4–11)

## 2018-06-30 PROCEDURE — 36415 COLL VENOUS BLD VENIPUNCTURE: CPT

## 2018-06-30 PROCEDURE — 85025 COMPLETE CBC W/AUTO DIFF WBC: CPT

## 2018-06-30 PROCEDURE — 80053 COMPREHEN METABOLIC PANEL: CPT

## 2018-06-30 PROCEDURE — 83036 HEMOGLOBIN GLYCOSYLATED A1C: CPT

## 2018-06-30 PROCEDURE — 84443 ASSAY THYROID STIM HORMONE: CPT

## 2018-06-30 PROCEDURE — 82306 VITAMIN D 25 HYDROXY: CPT

## 2018-06-30 PROCEDURE — 82607 VITAMIN B-12: CPT

## 2018-06-30 PROCEDURE — 80061 LIPID PANEL: CPT

## 2018-07-02 ENCOUNTER — TELEPHONE (OUTPATIENT)
Dept: SURGERY | Facility: CLINIC | Age: 33
End: 2018-07-02

## 2018-07-02 LAB — 25(OH)D3 SERPL-MCNC: 15.8 NG/ML

## 2018-07-02 NOTE — TELEPHONE ENCOUNTER
\"No precert required\" per Dalton Pittman at [facility] for procedure on 06/07/2018, reference [phone].

## 2018-07-21 ENCOUNTER — HOSPITAL ENCOUNTER (OUTPATIENT)
Dept: ULTRASOUND IMAGING | Facility: HOSPITAL | Age: 33
Discharge: HOME | End: 2018-07-21
Attending: OBSTETRICS & GYNECOLOGY
Payer: COMMERCIAL

## 2018-07-21 DIAGNOSIS — Z87.42 HISTORY OF OVARIAN CYST: ICD-10-CM

## 2018-07-21 PROCEDURE — 76830 TRANSVAGINAL US NON-OB: CPT | Performed by: OBSTETRICS & GYNECOLOGY

## 2018-07-21 PROCEDURE — 76856 US EXAM PELVIC COMPLETE: CPT | Performed by: OBSTETRICS & GYNECOLOGY

## 2018-07-23 ENCOUNTER — OFFICE VISIT (OUTPATIENT)
Dept: INTERNAL MEDICINE CLINIC | Facility: CLINIC | Age: 33
End: 2018-07-23
Payer: COMMERCIAL

## 2018-07-23 VITALS
HEIGHT: 63 IN | HEART RATE: 78 BPM | WEIGHT: 235 LBS | TEMPERATURE: 99 F | DIASTOLIC BLOOD PRESSURE: 80 MMHG | BODY MASS INDEX: 41.64 KG/M2 | OXYGEN SATURATION: 100 % | RESPIRATION RATE: 17 BRPM | SYSTOLIC BLOOD PRESSURE: 126 MMHG

## 2018-07-23 DIAGNOSIS — E66.01 MORBID OBESITY (HCC): ICD-10-CM

## 2018-07-23 DIAGNOSIS — Z51.81 THERAPEUTIC DRUG MONITORING: Primary | ICD-10-CM

## 2018-07-23 PROCEDURE — 99213 OFFICE O/P EST LOW 20 MIN: CPT | Performed by: INTERNAL MEDICINE

## 2018-07-23 RX ORDER — PHENTERMINE HYDROCHLORIDE 37.5 MG/1
37.5 TABLET ORAL
Qty: 30 TABLET | Refills: 0 | Status: SHIPPED | OUTPATIENT
Start: 2018-07-23 | End: 2018-08-22

## 2018-07-23 NOTE — PATIENT INSTRUCTIONS
Build Muscle & Lose Fat  The great fat vs muscle diagram below paints a clear picture of why it’s so important for you to build muscle in order to lose fat.   Maybe Rin Kessler wondered about muscle vs fat and why you need to build muscle to lose fat, look slim 3. Build confidence. Strong muscles and joints increase your level of confidence in your abilities to perform many lifestyle activities. 4. Prevent injury.  Strength training can build stronger muscles and more limber, flexible joints, which play a crucial

## 2018-07-23 NOTE — PROGRESS NOTES
Governor Lanes is a 28year old female.     Chief complaint:weight loss follow up , medication refill    HPI:   HERI here for follow up on weight loss   Wt Readings from Last 12 Encounters:  06/27/18 : 238 lb 6.4 oz  06/26/18 : 238 lb 6.4 oz  06/07/18 : 235 Positive BRENDAN (antinuclear antibody)    • Rectal bleeding    • Seborrhea    • Visual impairment     glasses/contacts     Past Surgical History:  2014: COLONOSCOPY      Comment: diverticulitis  06/07/2018: LAPAROSCOPIC CHOLECYSTECTOMY     Social History:  [place] since LOV 1 month ago. Patient has lost a total weight loss of  3lbs # since first weight loss consult. Labs reviewed  · Increase phentermine to 37.5   · Continue low carb high protein diet   · Advised to increase exercise         Plan:  1.  Nutrition: low

## 2018-07-25 ENCOUNTER — TELEPHONE (OUTPATIENT)
Dept: OBGYN CLINIC | Facility: CLINIC | Age: 33
End: 2018-07-25

## 2018-07-25 NOTE — TELEPHONE ENCOUNTER
----- Message from Leta Grant MD sent at 7/25/2018  8:03 AM CDT -----  Please let patient know that her US results were normal. She has a small 1cm follicle on her right ovary. This is normal with ovulation.

## 2018-08-25 ENCOUNTER — OFFICE VISIT (OUTPATIENT)
Dept: INTERNAL MEDICINE CLINIC | Facility: CLINIC | Age: 33
End: 2018-08-25
Payer: COMMERCIAL

## 2018-08-25 VITALS
OXYGEN SATURATION: 99 % | WEIGHT: 228 LBS | TEMPERATURE: 99 F | BODY MASS INDEX: 40.4 KG/M2 | SYSTOLIC BLOOD PRESSURE: 114 MMHG | DIASTOLIC BLOOD PRESSURE: 72 MMHG | HEIGHT: 63 IN | RESPIRATION RATE: 17 BRPM | HEART RATE: 78 BPM

## 2018-08-25 DIAGNOSIS — Z51.81 THERAPEUTIC DRUG MONITORING: Primary | ICD-10-CM

## 2018-08-25 DIAGNOSIS — E55.9 VITAMIN D DEFICIENCY: ICD-10-CM

## 2018-08-25 DIAGNOSIS — E66.01 MORBID OBESITY (HCC): ICD-10-CM

## 2018-08-25 PROCEDURE — 99213 OFFICE O/P EST LOW 20 MIN: CPT | Performed by: INTERNAL MEDICINE

## 2018-08-25 RX ORDER — PHENTERMINE HYDROCHLORIDE 37.5 MG/1
37.5 TABLET ORAL
Qty: 30 TABLET | Refills: 0 | Status: SHIPPED | OUTPATIENT
Start: 2018-08-25 | End: 2018-09-24

## 2018-08-25 NOTE — PROGRESS NOTES
Maulik Oliver is a 28year old female.     Chief complaint:weight loss follow up , medication refill    HPI:   HERI here for follow up on weight loss   Wt Readings from Last 12 Encounters:  08/25/18 : 228 lb  07/23/18 : 235 lb  06/27/18 : 238 lb 6.4 oz  06/ Used                      Alcohol use: Yes           2.0 oz/week     Glasses of wine: 4 per week     Comment: Per pt socially drink on weekends       Family History   Problem Relation Age of Onset   • Diabetes Maternal Grandfather    • Diabetes Paternal Gr a total weight loss of 10 lbs # since first weight loss consult. Labs reviewed  · Continue phentermine 37.5   · Discussed importance to add exercise   · Increase fluid intake           Plan:  1. Nutrition: low carb diet   2.  Referral RD/nutritionist : no

## 2018-08-25 NOTE — PATIENT INSTRUCTIONS
Healthy Tips for Eating Out  Choices  It’s not easy to change the habits of a lifetime. Experts say that it can take 6 months just to change one old habit for a healthier one. That’s why gradual change is so important.  These tips are reminders of the dozen the skin from fried chicken and choose mashed potatoes, corn on the cob, and baked beans on the side. · Order a broiled chicken salad and pile on fresh vegetables from the salad bar. Use a small amount of low-fat dressing.   · Top a baked potato with cotta foods made with butter, coconut or palm oil, or hydrogenated fats    © 8746-6923 The [employer] [facility] [employer], [address] [place]. All rights reserved. This information is not intended as a substitute for professional medical care.  Glory Levine

## 2018-09-05 ENCOUNTER — TELEPHONE (OUTPATIENT)
Dept: SURGERY | Facility: CLINIC | Age: 33
End: 2018-09-05

## 2018-09-05 NOTE — TELEPHONE ENCOUNTER
Contacted patient. S/P Laparoscopic cholecystectomy on 06/07/2018. Patient states over past couple weeks has been experiencing constant dull pain to surgery sites, pain to touch at incision sites. Denies redness, inflammation, drainage, warmth at sites.  No

## 2018-09-05 NOTE — TELEPHONE ENCOUNTER
Patient states she has been experiencing \"dull pain\" for a couple weeks and states she noticed yesterday when she touched the surgery site that its painful to the touch. States is not warm to touch or and there is no redness.  Requesting a call back to se

## 2018-09-22 ENCOUNTER — OFFICE VISIT (OUTPATIENT)
Dept: INTERNAL MEDICINE CLINIC | Facility: CLINIC | Age: 33
End: 2018-09-22
Payer: COMMERCIAL

## 2018-09-22 VITALS
TEMPERATURE: 99 F | DIASTOLIC BLOOD PRESSURE: 82 MMHG | RESPIRATION RATE: 17 BRPM | WEIGHT: 222 LBS | HEIGHT: 63 IN | OXYGEN SATURATION: 99 % | SYSTOLIC BLOOD PRESSURE: 100 MMHG | BODY MASS INDEX: 39.34 KG/M2 | HEART RATE: 77 BPM

## 2018-09-22 DIAGNOSIS — E66.01 MORBID OBESITY (HCC): ICD-10-CM

## 2018-09-22 DIAGNOSIS — Z76.0 MEDICATION REFILL: Primary | ICD-10-CM

## 2018-09-22 DIAGNOSIS — E55.9 VITAMIN D DEFICIENCY: ICD-10-CM

## 2018-09-22 DIAGNOSIS — E53.8 LOW VITAMIN B12 LEVEL: ICD-10-CM

## 2018-09-22 PROCEDURE — 96372 THER/PROPH/DIAG INJ SC/IM: CPT | Performed by: INTERNAL MEDICINE

## 2018-09-22 PROCEDURE — 99213 OFFICE O/P EST LOW 20 MIN: CPT | Performed by: INTERNAL MEDICINE

## 2018-09-22 RX ORDER — PHENTERMINE HYDROCHLORIDE 37.5 MG/1
37.5 TABLET ORAL
Qty: 60 TABLET | Refills: 0 | Status: SHIPPED | OUTPATIENT
Start: 2018-09-22 | End: 2018-11-21

## 2018-09-22 RX ORDER — CYANOCOBALAMIN 1000 UG/ML
1000 INJECTION INTRAMUSCULAR; SUBCUTANEOUS ONCE
Status: COMPLETED | OUTPATIENT
Start: 2018-09-22 | End: 2018-09-22

## 2018-09-22 RX ADMIN — CYANOCOBALAMIN 1000 MCG: 1000 INJECTION INTRAMUSCULAR; SUBCUTANEOUS at 12:20:00

## 2018-09-22 NOTE — PROGRESS NOTES
Brian Jones is a 28year old female.     Chief complaint:weight loss follow up , medication refill, low vitamin b12 , vitamin d def      HPI:   HERI here for follow up on weight loss   Wt Readings from Last 12 Encounters:  09/22/18 : 222 lb  08/25/18 : 22 COLONOSCOPY      Comment:  diverticulitis  06/07/2018: LAPAROSCOPIC CHOLECYSTECTOMY  6/7/2018: LAPAROSCOPIC CHOLECYSTECTOMY; N/A      Comment:  Performed by Huan Carty MD at [address] [place] [address] MAIN OR     Social History:  Social History    Tobacco Use      Smoking status refill  (primary encounter diagnosis)    (E66.01) Morbid obesity (HCC)    (E55.9) Vitamin D deficiency    (E53.8) Low vitamin B12 level    Plan:  · Patient has lost 6 lbs # since LOV 1 month ago.  Patient has lost a total weight loss of 16 lbs # since first

## 2018-09-22 NOTE — PATIENT INSTRUCTIONS
Exercise: Adding Intensity  You have been exercising for 30 minutes most days of the week. Now you can move on to the next stage: increasing the intensity. This means doing your activity in one or more of these ways:  · Longer.  Exercise for 30 minutes or m © 1932-9144 [address], [address] [place]. All rights reserved. This information is not intended as a substitute for professional medical care. Always follow your healthcare professional's instructions.

## 2018-11-27 ENCOUNTER — OFFICE VISIT (OUTPATIENT)
Dept: INTERNAL MEDICINE CLINIC | Facility: CLINIC | Age: 33
End: 2018-11-27
Payer: COMMERCIAL

## 2018-11-27 VITALS
BODY MASS INDEX: 37.74 KG/M2 | HEIGHT: 63 IN | RESPIRATION RATE: 18 BRPM | TEMPERATURE: 99 F | DIASTOLIC BLOOD PRESSURE: 84 MMHG | HEART RATE: 78 BPM | WEIGHT: 213 LBS | SYSTOLIC BLOOD PRESSURE: 122 MMHG | OXYGEN SATURATION: 98 %

## 2018-11-27 DIAGNOSIS — Z51.81 THERAPEUTIC DRUG MONITORING: ICD-10-CM

## 2018-11-27 DIAGNOSIS — E66.01 MORBID OBESITY (HCC): ICD-10-CM

## 2018-11-27 DIAGNOSIS — Z76.0 MEDICATION REFILL: Primary | ICD-10-CM

## 2018-11-27 PROCEDURE — 99213 OFFICE O/P EST LOW 20 MIN: CPT | Performed by: INTERNAL MEDICINE

## 2018-11-27 RX ORDER — KETOCONAZOLE 20 MG/ML
SHAMPOO TOPICAL
COMMUNITY
Start: 2018-10-04 | End: 2019-02-12

## 2018-11-27 RX ORDER — ERGOCALCIFEROL 1.25 MG/1
CAPSULE ORAL
COMMUNITY
Start: 2018-09-23 | End: 2020-02-25

## 2018-11-27 RX ORDER — PHENTERMINE HYDROCHLORIDE 15 MG/1
15 CAPSULE ORAL EVERY MORNING
COMMUNITY
End: 2019-03-16 | Stop reason: ALTCHOICE

## 2018-11-27 RX ORDER — FLUCONAZOLE 200 MG/1
TABLET ORAL
COMMUNITY
Start: 2018-10-04 | End: 2019-02-12

## 2018-11-27 RX ORDER — PHENTERMINE HYDROCHLORIDE 37.5 MG/1
37.5 TABLET ORAL
Qty: 60 TABLET | Refills: 0 | Status: SHIPPED | OUTPATIENT
Start: 2018-11-27 | End: 2019-01-26

## 2018-11-27 NOTE — PROGRESS NOTES
Avani Older is a 28year old female.     Chief complaint:weight loss follow up , medication refill    HPI:   HERI here for follow up on weight loss   Wt Readings from Last 12 Encounters:  11/27/18 : 213 lb  09/22/18 : 222 lb  08/25/18 : 228 lb  07/23/18 : antibody)    • Rectal bleeding    • Seborrhea    • Visual impairment     glasses/contacts     Past Surgical History:   Procedure Laterality Date   • COLONOSCOPY  2014    diverticulitis   • LAPAROSCOPIC CHOLECYSTECTOMY  06/07/2018   • LAPAROSCOPIC CHOLECYST no gross deficits              Orders Placed This Encounter      ergocalciferol 23197 units Oral Cap      fluconazole 200 MG Oral Tab      Ketoconazole 2 % External Shampoo      Phentermine HCl 15 MG Oral Cap          Sig: Take 15 mg by mouth every morning

## 2018-11-27 NOTE — PATIENT INSTRUCTIONS
Healthy Tips for Eating Out  Choices  It’s not easy to change the habits of a lifetime. Experts say that it can take 6 months just to change one old habit for a healthier one. That’s why gradual change is so important.  These tips are reminders of the dozen the skin from fried chicken and choose mashed potatoes, corn on the cob, and baked beans on the side. · Order a broiled chicken salad and pile on fresh vegetables from the salad bar. Use a small amount of low-fat dressing.   · Top a baked potato with cotta foods made with butter, coconut or palm oil, or hydrogenated fats    © 2277-5253 [facility], [address] [place]. All rights reserved. This information is not intended as a substitute for professional medical care.  Karey Vincent

## 2018-12-26 RX ORDER — ERGOCALCIFEROL 1.25 MG/1
CAPSULE ORAL
Qty: 12 CAPSULE | Refills: 0 | Status: SHIPPED | OUTPATIENT
Start: 2018-12-26 | End: 2019-02-12

## 2019-01-07 ENCOUNTER — TELEPHONE (OUTPATIENT)
Dept: INTERNAL MEDICINE CLINIC | Facility: CLINIC | Age: 34
End: 2019-01-07

## 2019-01-07 DIAGNOSIS — L98.9 SKIN LESION: Primary | ICD-10-CM

## 2019-01-07 NOTE — TELEPHONE ENCOUNTER
Pt called and states she is having a dermatological problem and would like for Dr Bennett Zee to recommend a dermatologist for her as soon as possible.

## 2019-02-12 ENCOUNTER — OFFICE VISIT (OUTPATIENT)
Dept: OBGYN CLINIC | Facility: CLINIC | Age: 34
End: 2019-02-12
Payer: COMMERCIAL

## 2019-02-12 VITALS
WEIGHT: 206.38 LBS | HEIGHT: 61.5 IN | HEART RATE: 70 BPM | SYSTOLIC BLOOD PRESSURE: 132 MMHG | BODY MASS INDEX: 38.47 KG/M2 | DIASTOLIC BLOOD PRESSURE: 92 MMHG

## 2019-02-12 DIAGNOSIS — Z01.419 ENCOUNTER FOR GYNECOLOGICAL EXAMINATION WITHOUT ABNORMAL FINDING: Primary | ICD-10-CM

## 2019-02-12 DIAGNOSIS — Z30.41 ENCOUNTER FOR SURVEILLANCE OF CONTRACEPTIVE PILLS: ICD-10-CM

## 2019-02-12 DIAGNOSIS — Z12.4 CERVICAL CANCER SCREENING: ICD-10-CM

## 2019-02-12 PROCEDURE — 99395 PREV VISIT EST AGE 18-39: CPT | Performed by: OBSTETRICS & GYNECOLOGY

## 2019-02-12 RX ORDER — NORGESTIMATE AND ETHINYL ESTRADIOL 0.25-0.035
1 KIT ORAL
Qty: 28 TABLET | Refills: 1 | Status: SHIPPED | OUTPATIENT
Start: 2019-02-12 | End: 2019-04-27

## 2019-02-13 LAB — HPV I/H RISK 1 DNA SPEC QL NAA+PROBE: NEGATIVE

## 2019-02-13 NOTE — ADDENDUM NOTE
Addended by: Kezia Lewis on: 2/12/2019 06:44 PM     Modules accepted: Orders Valerio Leija is a 81 year old female presenting with c/o itching all over her body 1 week. Pt has tried some OTC creams with no relief.    Medications reviewed and updated.  Denies known Latex allergy or symptoms of Latex sensitivity.  History   Smoking Status   • Former Smoker   • Packs/day: 0.75   • Years: 40.00   • Types: Cigarettes   • Quit date: 4/24/2004   Smokeless Tobacco   • Never Used     Health Maintenance Summary     Topic Due On Due Status Completed On    Osteoporosis Screening  Completed Sep 8, 2016    Diabetes Eye Exam May 8, 2019 Not Due May 8, 2018    Glycohemoglobin A1C  (Diabetes Sugar)  Aug 2, 2018 Due Soon Feb 2, 2018    GFR  (Kidney Function Test)  Aug 21, 2018 Due Soon Aug 21, 2017    Diabetes Foot Exam  Aug 21, 2018 Due Soon Aug 21, 2017    Medicare Wellness Visit Jul 12, 2017 Overdue Jul 12, 2016    IMMUNIZATION - DTaP/Tdap/Td Aug 21, 2024 Not Due Aug 21, 2014    Immunization-Influenza Sep 1, 2018 Not Due Sep 20, 2016      Completed Nov 13, 2014          Patient is due for topics as listed above, she wishes to discuss with provider .      Unaddressed Risk Adjusted HCC Categories and Diagnoses  HCC 11 - Colorectal, Bladder and Other Cancers    Unaddressed Dx:Malignant neoplasm of transverse colon (CMS/HCC)  HCC 23 - Significant Endocrine and Metabolic Disorders   Unaddressed Dx:Secondary renal hyperparathyroidism (CMS/HCC)  HCC 85 - Congestive Heart Failure   Unaddressed Dx:Other secondary pulmonary hypertension (CMS/HCC)  HCC 96 - Heart Arrhythmias   Unaddressed Dx:Paroxysmal atrial fibrillation (CMS/HCC)    Advance Directives:  on file

## 2019-02-13 NOTE — PROGRESS NOTES
Well Woman Exam    HPI:  The patient is a 29yo female who presents for annual exam. She is recently . She is taking OCPS but elevated BP today. She is on weight loss medication that can increase her BP.  She is going to stop it in a week and follow u Activity      Alcohol use:  Yes        Alcohol/week: 2.0 oz        Types: 4 Glasses of wine per week        Comment: Per pt socially drink on weekends      Drug use: No      Sexual activity: Yes        Birth control/protection: OCP    Other Topics      Conc normal without palpable masses, tenderness, asymmetry, nipple discharge, nipple retraction or skin changes  Abdomen:  soft, nontender, nondistended, no masses  Skin/Hair: no unusual rashes or bruises  Extremities: no edema, no cyanosis  Psychiatric: Approp

## 2019-02-23 ENCOUNTER — OFFICE VISIT (OUTPATIENT)
Dept: INTERNAL MEDICINE CLINIC | Facility: CLINIC | Age: 34
End: 2019-02-23
Payer: COMMERCIAL

## 2019-02-23 VITALS
HEART RATE: 69 BPM | SYSTOLIC BLOOD PRESSURE: 132 MMHG | DIASTOLIC BLOOD PRESSURE: 80 MMHG | BODY MASS INDEX: 38.7 KG/M2 | WEIGHT: 207.63 LBS | HEIGHT: 61.5 IN | OXYGEN SATURATION: 98 %

## 2019-02-23 DIAGNOSIS — E55.9 VITAMIN D DEFICIENCY: ICD-10-CM

## 2019-02-23 DIAGNOSIS — E53.8 LOW VITAMIN B12 LEVEL: ICD-10-CM

## 2019-02-23 DIAGNOSIS — E66.01 MORBID OBESITY (HCC): ICD-10-CM

## 2019-02-23 DIAGNOSIS — Z51.81 THERAPEUTIC DRUG MONITORING: Primary | ICD-10-CM

## 2019-02-23 PROCEDURE — 96372 THER/PROPH/DIAG INJ SC/IM: CPT | Performed by: INTERNAL MEDICINE

## 2019-02-23 PROCEDURE — 99214 OFFICE O/P EST MOD 30 MIN: CPT | Performed by: INTERNAL MEDICINE

## 2019-02-23 RX ORDER — CYANOCOBALAMIN 1000 UG/ML
1000 INJECTION INTRAMUSCULAR; SUBCUTANEOUS ONCE
Status: COMPLETED | OUTPATIENT
Start: 2019-02-23 | End: 2019-02-23

## 2019-02-23 RX ADMIN — CYANOCOBALAMIN 1000 MCG: 1000 INJECTION INTRAMUSCULAR; SUBCUTANEOUS at 12:44:00

## 2019-02-23 NOTE — PATIENT INSTRUCTIONS
Lorcaserin extended-release tablets  Brand Name: Belviq XR  What is this medicine? LORCASERIN (hill ca SER in) is used to promote and maintain weight loss in obese patients.  This medicine should be used with a reduced calorie diet and, if appropriate, an Side effects that usually do not require medical attention (report to your doctor or health care professional if they continue or are bothersome):  · back pain  · constipation  · cough  · dry mouth  · nausea  · tiredness  What may interact with this medici · pregnant or trying to get pregnant  · breast-feeding  What should I watch for while using this medicine? This medicine is intended to be used in addition to a healthy diet and appropriate exercise. The best results are achieved this way.  Your doctor ken It’s not easy to change the habits of a lifetime. Experts say that it can take 6 months just to change one old habit for a healthier one. That’s why gradual change is so important.  These tips are reminders of the dozens of small ways you can change your ha · Remove the skin from fried chicken and choose mashed potatoes, corn on the cob, and baked beans on the side. · Order a broiled chicken salad and pile on fresh vegetables from the salad bar. Use a small amount of low-fat dressing.   · Top a baked potato w · Sweets and foods made with butter, coconut or palm oil, or hydrogenated fats    © 7523-6242 The [facility], [address]. All rights reserved.  This information is not intended as a substitute for professional medic

## 2019-02-23 NOTE — PROGRESS NOTES
Artist Chel is a 28year old female.     Chief complaint:weight loss follow up , medication refill, therapeutica drug ministering     HPI:   HERI here for follow up on weight loss   Wt Readings from Last 12 Encounters:  02/23/19 : 207 lb 9.6 oz  02/12/19 Hemorrhoids    • Obesity, unspecified    • Positive BRENDAN (antinuclear antibody)    • Rectal bleeding    • Seborrhea    • Visual impairment     glasses/contacts     Past Surgical History:   Procedure Laterality Date   • COLONOSCOPY  2014    diverticulitis defined types were placed in this encounter.         ASSESSMENT/PLAN:   (Z51.81) Therapeutic drug monitoring  (primary encounter diagnosis)  Plan: HEPATIC FUNCTION PANEL (7), VITAMIN D,         25-HYDROXY, VITAMIN B12    (E66.01) Morbid obesity (HCC)    (E5 Obesity Medicine

## 2019-03-13 ENCOUNTER — TELEPHONE (OUTPATIENT)
Dept: INTERNAL MEDICINE CLINIC | Facility: CLINIC | Age: 34
End: 2019-03-13

## 2019-03-13 ENCOUNTER — APPOINTMENT (OUTPATIENT)
Dept: LAB | Facility: HOSPITAL | Age: 34
End: 2019-03-13
Attending: INTERNAL MEDICINE
Payer: COMMERCIAL

## 2019-03-13 DIAGNOSIS — E66.01 MORBID OBESITY (HCC): ICD-10-CM

## 2019-03-13 DIAGNOSIS — E53.8 LOW VITAMIN B12 LEVEL: ICD-10-CM

## 2019-03-13 DIAGNOSIS — Z51.81 THERAPEUTIC DRUG MONITORING: ICD-10-CM

## 2019-03-13 DIAGNOSIS — E55.9 VITAMIN D DEFICIENCY: ICD-10-CM

## 2019-03-13 LAB
25(OH)D3 SERPL-MCNC: 33 NG/ML (ref 30–100)
ALBUMIN SERPL-MCNC: 3.5 G/DL (ref 3.4–5)
ALP LIVER SERPL-CCNC: 56 U/L (ref 37–98)
ALT SERPL-CCNC: 41 U/L (ref 13–56)
AST SERPL-CCNC: 18 U/L (ref 15–37)
BILIRUB DIRECT SERPL-MCNC: 0.1 MG/DL (ref 0–0.2)
BILIRUB SERPL-MCNC: 0.3 MG/DL (ref 0.1–2)
M PROTEIN MFR SERPL ELPH: 7.3 G/DL (ref 6.4–8.2)
VIT B12 SERPL-MCNC: 270 PG/ML (ref 193–986)

## 2019-03-13 PROCEDURE — 80076 HEPATIC FUNCTION PANEL: CPT

## 2019-03-13 PROCEDURE — 82306 VITAMIN D 25 HYDROXY: CPT

## 2019-03-13 PROCEDURE — 82607 VITAMIN B-12: CPT

## 2019-03-13 PROCEDURE — 36415 COLL VENOUS BLD VENIPUNCTURE: CPT

## 2019-03-13 NOTE — TELEPHONE ENCOUNTER
Spoke to patient who reports pain on her right abdomen area. Says she was on vacation last week, & had DRINKS. She says she forgot she is not supposed to drink with Terbinafine (prescibed by her Dermatologist).  Pt is scheduled with Dr. Milla Cloud on Saturday

## 2019-03-16 ENCOUNTER — OFFICE VISIT (OUTPATIENT)
Dept: INTERNAL MEDICINE CLINIC | Facility: CLINIC | Age: 34
End: 2019-03-16
Payer: COMMERCIAL

## 2019-03-16 VITALS
HEART RATE: 84 BPM | RESPIRATION RATE: 19 BRPM | OXYGEN SATURATION: 100 % | SYSTOLIC BLOOD PRESSURE: 130 MMHG | WEIGHT: 212 LBS | TEMPERATURE: 98 F | BODY MASS INDEX: 39.51 KG/M2 | HEIGHT: 61.5 IN | DIASTOLIC BLOOD PRESSURE: 80 MMHG

## 2019-03-16 DIAGNOSIS — E55.9 VITAMIN D DEFICIENCY: ICD-10-CM

## 2019-03-16 DIAGNOSIS — E53.8 LOW VITAMIN B12 LEVEL: ICD-10-CM

## 2019-03-16 DIAGNOSIS — R10.9 RIGHT SIDED ABDOMINAL PAIN: Primary | ICD-10-CM

## 2019-03-16 LAB
AMYLASE SERPL-CCNC: 38 U/L (ref 25–115)
BASOPHILS # BLD AUTO: 0.04 X10(3) UL (ref 0–0.2)
BASOPHILS NFR BLD AUTO: 0.8 %
DEPRECATED RDW RBC AUTO: 50.4 FL (ref 35.1–46.3)
EOSINOPHIL # BLD AUTO: 0.06 X10(3) UL (ref 0–0.7)
EOSINOPHIL NFR BLD AUTO: 1.2 %
ERYTHROCYTE [DISTWIDTH] IN BLOOD BY AUTOMATED COUNT: 14 % (ref 11–15)
HCT VFR BLD AUTO: 44 % (ref 35–48)
HGB BLD-MCNC: 13.9 G/DL (ref 12–16)
IMM GRANULOCYTES # BLD AUTO: 0.01 X10(3) UL (ref 0–1)
IMM GRANULOCYTES NFR BLD: 0.2 %
LIPASE SERPL-CCNC: 113 U/L (ref 73–393)
LYMPHOCYTES # BLD AUTO: 1.37 X10(3) UL (ref 1–4)
LYMPHOCYTES NFR BLD AUTO: 27.2 %
MCH RBC QN AUTO: 30.8 PG (ref 26–34)
MCHC RBC AUTO-ENTMCNC: 31.6 G/DL (ref 31–37)
MCV RBC AUTO: 97.6 FL (ref 80–100)
MONOCYTES # BLD AUTO: 0.34 X10(3) UL (ref 0.1–1)
MONOCYTES NFR BLD AUTO: 6.7 %
NEUTROPHILS # BLD AUTO: 3.22 X10 (3) UL (ref 1.5–7.7)
NEUTROPHILS # BLD AUTO: 3.22 X10(3) UL (ref 1.5–7.7)
NEUTROPHILS NFR BLD AUTO: 63.9 %
PLATELET # BLD AUTO: 223 10(3)UL (ref 150–450)
RBC # BLD AUTO: 4.51 X10(6)UL (ref 3.8–5.3)
WBC # BLD AUTO: 5 X10(3) UL (ref 4–11)

## 2019-03-16 PROCEDURE — 99213 OFFICE O/P EST LOW 20 MIN: CPT | Performed by: INTERNAL MEDICINE

## 2019-03-16 PROCEDURE — 85025 COMPLETE CBC W/AUTO DIFF WBC: CPT | Performed by: INTERNAL MEDICINE

## 2019-03-16 PROCEDURE — 82150 ASSAY OF AMYLASE: CPT | Performed by: INTERNAL MEDICINE

## 2019-03-16 PROCEDURE — 36415 COLL VENOUS BLD VENIPUNCTURE: CPT | Performed by: INTERNAL MEDICINE

## 2019-03-16 PROCEDURE — 83690 ASSAY OF LIPASE: CPT | Performed by: INTERNAL MEDICINE

## 2019-03-16 NOTE — PROGRESS NOTES
Jeet Fournier is a 28year old female. Patient presents with:  Pain: pt presents to clinic c/o RT side pain around rib cage for 7-10 days       HPI:         Last week in [place]. Ate and drank richer foods, sushi, alcohol.   Derm placed her on Lamisil 25 CHOLECYSTECTOMY N/A 6/7/2018    Performed by Jamin Jimenez MD at [facility] MAIN OR      Family History   Problem Relation Age of Onset   • Diabetes Maternal Grandfather    • Diabetes Paternal Grandmother    • Heart Disorder Paternal Grandmother    • Heart Disorder Result Value Ref Range    AST 18 15 - 37 U/L    ALT 41 13 - 56 U/L    Alkaline Phosphatase 56 37 - 98 U/L    Bilirubin, Total 0.3 0.1 - 2.0 mg/dL    Bilirubin, Direct 0.1 0.0 - 0.2 mg/dL    Total Protein 7.3 6.4 - 8.2 g/dL    Albumin 3.5 3.4 - 5.0 g/dL

## 2019-03-16 NOTE — PROGRESS NOTES
Pt presented to clinic today for blood draw. Per physician able to draw orders. Orders  documented within chart. Pt tolerated lab draw well.  verified.   Orders drawn include: cbc, amylase, lipase   Site of draw: rt erica Mondragon CMA

## 2019-03-17 PROBLEM — K80.21 CALCULUS OF GALLBLADDER WITH BILIARY OBSTRUCTION BUT WITHOUT CHOLECYSTITIS: Status: RESOLVED | Noted: 2018-05-21 | Resolved: 2019-03-17

## 2019-03-17 NOTE — PATIENT INSTRUCTIONS
Follow-up in 2-4 weeks, or sooner if needed  Go to ER if acute pain develops  Avoid terbinafine for 2 days, avoid alcohol while on this medicine  Avoid heavy lifting

## 2019-03-25 ENCOUNTER — TELEPHONE (OUTPATIENT)
Dept: INTERNAL MEDICINE CLINIC | Facility: CLINIC | Age: 34
End: 2019-03-25

## 2019-03-25 NOTE — TELEPHONE ENCOUNTER
Patient insurance does not cover Lorcaserin HCl ER (BELVIQ XR) 20 MG. She needs a different medication .

## 2019-04-03 DIAGNOSIS — R10.9 ABDOMINAL PAIN, UNSPECIFIED ABDOMINAL LOCATION: Primary | ICD-10-CM

## 2019-04-03 NOTE — TELEPHONE ENCOUNTER
Called the patient   Since she is with abdominal pain prefer to hold off on starting any new med   Will do UA and if unexplained CT abdomen

## 2019-04-04 DIAGNOSIS — Z30.41 ENCOUNTER FOR SURVEILLANCE OF CONTRACEPTIVE PILLS: ICD-10-CM

## 2019-04-04 RX ORDER — NORGESTIMATE AND ETHINYL ESTRADIOL 0.25-0.035
KIT ORAL
Qty: 28 TABLET | Refills: 0 | OUTPATIENT
Start: 2019-04-04

## 2019-04-05 ENCOUNTER — APPOINTMENT (OUTPATIENT)
Dept: LAB | Facility: HOSPITAL | Age: 34
End: 2019-04-05
Attending: INTERNAL MEDICINE
Payer: COMMERCIAL

## 2019-04-05 DIAGNOSIS — R10.9 ABDOMINAL PAIN, UNSPECIFIED ABDOMINAL LOCATION: ICD-10-CM

## 2019-04-05 PROCEDURE — 81003 URINALYSIS AUTO W/O SCOPE: CPT

## 2019-04-08 ENCOUNTER — TELEPHONE (OUTPATIENT)
Dept: INTERNAL MEDICINE CLINIC | Facility: CLINIC | Age: 34
End: 2019-04-08

## 2019-04-08 NOTE — TELEPHONE ENCOUNTER
Pt called and states that she has an order for a CT scan and is not sure if she will do it at [facility] or [facility]. I mailed the referral to Pt.

## 2019-04-11 ENCOUNTER — TELEPHONE (OUTPATIENT)
Dept: INTERNAL MEDICINE CLINIC | Facility: CLINIC | Age: 34
End: 2019-04-11

## 2019-04-15 DIAGNOSIS — Z30.41 ENCOUNTER FOR SURVEILLANCE OF CONTRACEPTIVE PILLS: ICD-10-CM

## 2019-04-15 RX ORDER — NORGESTIMATE AND ETHINYL ESTRADIOL 0.25-0.035
KIT ORAL
Qty: 28 TABLET | Refills: 0 | OUTPATIENT
Start: 2019-04-15

## 2019-04-15 NOTE — TELEPHONE ENCOUNTER
Pt's last annual was 2/12/19 with normal cotest.  Pt was given 2 months of ocps and was to return to the clinic in 4/2019 for a BP check. No appt has been made. Rx denied. Pt needs appt.

## 2019-04-16 ENCOUNTER — TELEPHONE (OUTPATIENT)
Dept: OBGYN CLINIC | Facility: CLINIC | Age: 34
End: 2019-04-16

## 2019-04-16 RX ORDER — NORGESTIMATE AND ETHINYL ESTRADIOL 0.25-0.035
KIT ORAL
Qty: 3 PACKAGE | Refills: 3 | Status: SHIPPED | OUTPATIENT
Start: 2019-04-16 | End: 2020-02-25

## 2019-04-16 NOTE — TELEPHONE ENCOUNTER
Pt states her blood pressure has gone down. hasd it checked by 2 other physicians in [facility]  Needs birth control prescribed

## 2019-04-16 NOTE — TELEPHONE ENCOUNTER
[id number] Erin Montes for refill for the year except patient has missed two days so needs to use condoms and start OCPs with next menses.

## 2019-04-16 NOTE — TELEPHONE ENCOUNTER
Pt saw oHuston Smoker 2-12-19 and her notes state erx sent for two months and need bp check in two months. She states she saw two different MDs and they took her blood pressure.  Pt states that she saw a MD on 2-23-19, Dr. Thai Matthew and her BP is in there for th

## 2019-04-23 ENCOUNTER — TELEPHONE (OUTPATIENT)
Dept: INTERNAL MEDICINE CLINIC | Facility: CLINIC | Age: 34
End: 2019-04-23

## 2019-04-27 ENCOUNTER — OFFICE VISIT (OUTPATIENT)
Dept: INTERNAL MEDICINE CLINIC | Facility: CLINIC | Age: 34
End: 2019-04-27
Payer: COMMERCIAL

## 2019-04-27 VITALS
BODY MASS INDEX: 39.82 KG/M2 | HEART RATE: 75 BPM | HEIGHT: 61.5 IN | WEIGHT: 213.63 LBS | OXYGEN SATURATION: 98 % | DIASTOLIC BLOOD PRESSURE: 80 MMHG | SYSTOLIC BLOOD PRESSURE: 118 MMHG | TEMPERATURE: 99 F | RESPIRATION RATE: 17 BRPM

## 2019-04-27 DIAGNOSIS — Z51.81 THERAPEUTIC DRUG MONITORING: ICD-10-CM

## 2019-04-27 DIAGNOSIS — R10.9 ABDOMINAL PAIN, UNSPECIFIED ABDOMINAL LOCATION: Primary | ICD-10-CM

## 2019-04-27 DIAGNOSIS — E66.01 MORBID OBESITY (HCC): ICD-10-CM

## 2019-04-27 PROCEDURE — 99214 OFFICE O/P EST MOD 30 MIN: CPT | Performed by: INTERNAL MEDICINE

## 2019-04-27 RX ORDER — PHENTERMINE HYDROCHLORIDE 15 MG/1
15 CAPSULE ORAL EVERY MORNING
Qty: 30 CAPSULE | Refills: 0 | Status: SHIPPED | OUTPATIENT
Start: 2019-04-27 | End: 2019-06-06

## 2019-04-27 RX ORDER — TERBINAFINE HYDROCHLORIDE 250 MG/1
250 TABLET ORAL DAILY
COMMUNITY
End: 2020-02-25

## 2019-04-27 NOTE — PROGRESS NOTES
Gladys Bates is a 27year old female.     Chief complaint:weight loss follow up , medication refill, abdominal pain    HPI:   HERI here for follow up on weight loss   Wt Readings from Last 12 Encounters:  04/27/19 : 213 lb 9.6 oz  03/16/19 : 212 lb  02/23/ Past Surgical History:   Procedure Laterality Date   • COLONOSCOPY  2014    diverticulitis   • LAPAROSCOPIC CHOLECYSTECTOMY  06/07/2018   • LAPAROSCOPIC CHOLECYSTECTOMY N/A 6/7/2018    Performed by Jessenia Pearson MD at [facility] MAIN OR        Social History:  S obesity ([facility] [address]    (R10.9) Abdominal pain, unspecified abdominal location    Plan:  · Patient has lost and gained 6 lbs since last visit # since LOV 2 month ago. Patient has lost a total weight loss of 25 lbs # since first weight loss consult.   Labs reviewed

## 2019-04-27 NOTE — PATIENT INSTRUCTIONS
Sphincter of Oddi Dysfunction    The sphincter of Oddi is part of your digestive system. A sphincter is a [place] of muscle.  The sphincter of Oddi surrounds the end of the tubes (ducts) that connect the gallbladder, liver, and pancreas to the first part o You may also have some imaging tests. These let the healthcare provider get a closer look at the gallbladder, liver, pancreas, and the ducts connecting them.  You may have one or more of these tests:  · Ultrasound. This uses sound waves and a computer to cr SOD can also be difficult to treat. You will probably be referred to a healthcare provider who specializes in treating SOD.  Treatment of SOD depends on your test results and how severe your symptoms are.  If you have symptoms of SOD but your tests do not s © 8844-6932 The [address] [facility] [place]. All rights reserved. This information is not intended as a substitute for professional medical care. Always follow your healthcare professional's instructions.

## 2019-06-06 DIAGNOSIS — Z51.81 THERAPEUTIC DRUG MONITORING: ICD-10-CM

## 2019-06-06 DIAGNOSIS — R10.9 ABDOMINAL PAIN, UNSPECIFIED ABDOMINAL LOCATION: ICD-10-CM

## 2019-06-06 DIAGNOSIS — E66.01 MORBID OBESITY (HCC): ICD-10-CM

## 2019-06-07 NOTE — TELEPHONE ENCOUNTER
Last office visit: 4/27/2019 weight follow up  Last refill:4/27/2019 qty:30  Pt has upcoming appt 6/22/19

## 2019-06-10 RX ORDER — PHENTERMINE HYDROCHLORIDE 15 MG/1
CAPSULE ORAL
Qty: 30 CAPSULE | Refills: 0 | Status: SHIPPED | OUTPATIENT
Start: 2019-06-10

## 2019-06-11 ENCOUNTER — LAB ENCOUNTER (OUTPATIENT)
Dept: LAB | Facility: HOSPITAL | Age: 34
End: 2019-06-11
Attending: GENERAL PRACTICE
Payer: COMMERCIAL

## 2019-06-11 DIAGNOSIS — Z79.2 ENCOUNTER FOR LONG-TERM (CURRENT) USE OF ANTIBIOTICS: Primary | ICD-10-CM

## 2019-06-11 PROCEDURE — 36415 COLL VENOUS BLD VENIPUNCTURE: CPT

## 2019-06-11 PROCEDURE — 80076 HEPATIC FUNCTION PANEL: CPT

## 2019-06-22 ENCOUNTER — OFFICE VISIT (OUTPATIENT)
Dept: INTERNAL MEDICINE CLINIC | Facility: CLINIC | Age: 34
End: 2019-06-22
Payer: COMMERCIAL

## 2019-06-22 VITALS
HEIGHT: 61.5 IN | SYSTOLIC BLOOD PRESSURE: 122 MMHG | OXYGEN SATURATION: 98 % | HEART RATE: 79 BPM | DIASTOLIC BLOOD PRESSURE: 80 MMHG | BODY MASS INDEX: 38.95 KG/M2 | WEIGHT: 209 LBS

## 2019-06-22 DIAGNOSIS — Z51.81 THERAPEUTIC DRUG MONITORING: Primary | ICD-10-CM

## 2019-06-22 DIAGNOSIS — E66.01 MORBID OBESITY (HCC): ICD-10-CM

## 2019-06-22 PROCEDURE — 99213 OFFICE O/P EST LOW 20 MIN: CPT | Performed by: INTERNAL MEDICINE

## 2019-06-22 RX ORDER — PHENTERMINE HYDROCHLORIDE 37.5 MG/1
37.5 TABLET ORAL
Qty: 30 TABLET | Refills: 0 | Status: SHIPPED | OUTPATIENT
Start: 2019-06-22 | End: 2019-07-22

## 2019-06-22 NOTE — PROGRESS NOTES
Mariana Disla is a 27year old female.     Chief complaint:weight loss follow up , medication refill, therapeutic drug monitoring    HPI:   HERI here for follow up on weight loss   Wt Readings from Last 12 Encounters:  06/22/19 : 209 lb  04/27/19 : 213 lb 9 History:   Procedure Laterality Date   • COLONOSCOPY  2014    diverticulitis   • LAPAROSCOPIC CHOLECYSTECTOMY  06/07/2018   • LAPAROSCOPIC CHOLECYSTECTOMY N/A 6/7/2018    Performed by Morton Epley, MD at [facility] MAIN OR        Social History:  Social History    T month ago. Patient has lost a total weight loss of 29 lbs # since first weight loss consult.   Labs reviewed  · Increase phentermine to 37.5   · Advised to increase exercise goal is 150 min per week   · Start counting carbs goal is 30-50 g per day   · Advis

## 2019-06-22 NOTE — PATIENT INSTRUCTIONS
How to Stop Emotional Eating and Overeating   You have to know how to stop emotional eating and stop overeating if you want to lose weight and keep it off successfully. Emotional eating and overeating can’t really satisfy an insatiable appetite anyway.   A a list of healthy activities you enjoy. And, whenever you feel the need, treat yourself to something on your list.  6. Start eating healthier.  When you eat for health you’ll choose more high fiber foods, such as vegetables, beans, whole grains and fresh fr

## 2019-07-18 DIAGNOSIS — Z51.81 THERAPEUTIC DRUG MONITORING: ICD-10-CM

## 2019-07-18 DIAGNOSIS — E66.01 MORBID OBESITY (HCC): ICD-10-CM

## 2019-07-22 RX ORDER — PHENTERMINE HYDROCHLORIDE 37.5 MG/1
37.5 TABLET ORAL
Qty: 30 TABLET | Refills: 0 | Status: SHIPPED | OUTPATIENT
Start: 2019-07-22 | End: 2019-08-21

## 2019-08-28 ENCOUNTER — TELEPHONE (OUTPATIENT)
Dept: INTERNAL MEDICINE CLINIC | Facility: CLINIC | Age: 34
End: 2019-08-28

## 2019-08-28 DIAGNOSIS — R79.9 ABNORMAL BLOOD CHEMISTRY: Primary | ICD-10-CM

## 2019-08-28 NOTE — TELEPHONE ENCOUNTER
Pt called and is wondering if Dr Vern Garces can recommend a doctor that specializes in auto immune issues. Pt had some blood work recently that suggests that she may be on the verge of something auto immune going on and so she is concerned.  Please advise

## 2019-09-06 ENCOUNTER — LAB ENCOUNTER (OUTPATIENT)
Dept: LAB | Facility: HOSPITAL | Age: 34
End: 2019-09-06
Attending: GENERAL PRACTICE
Payer: COMMERCIAL

## 2019-09-06 DIAGNOSIS — L66.9 CICATRICIAL ALOPECIA: Primary | ICD-10-CM

## 2019-09-06 LAB
ALBUMIN SERPL-MCNC: 3.8 G/DL (ref 3.4–5)
ALBUMIN/GLOB SERPL: 1 {RATIO} (ref 1–2)
ALP LIVER SERPL-CCNC: 59 U/L (ref 37–98)
ALT SERPL-CCNC: 24 U/L (ref 13–56)
ANION GAP SERPL CALC-SCNC: 7 MMOL/L (ref 0–18)
BASOPHILS # BLD AUTO: 0.04 X10(3) UL (ref 0–0.2)
BASOPHILS NFR BLD AUTO: 0.7 %
BILIRUB SERPL-MCNC: 0.4 MG/DL (ref 0.1–2)
BILIRUB UR QL: NEGATIVE
BUN BLD-MCNC: 13 MG/DL (ref 7–18)
BUN/CREAT SERPL: 14 (ref 10–20)
CALCIUM BLD-MCNC: 8.9 MG/DL (ref 8.5–10.1)
CHLORIDE SERPL-SCNC: 107 MMOL/L (ref 98–112)
CO2 SERPL-SCNC: 26 MMOL/L (ref 21–32)
COLOR UR: YELLOW
CREAT BLD-MCNC: 0.93 MG/DL (ref 0.55–1.02)
CRP SERPL-MCNC: <0.29 MG/DL (ref ?–0.3)
DEPRECATED RDW RBC AUTO: 45.4 FL (ref 35.1–46.3)
EOSINOPHIL # BLD AUTO: 0.06 X10(3) UL (ref 0–0.7)
EOSINOPHIL NFR BLD AUTO: 1.1 %
ERYTHROCYTE [DISTWIDTH] IN BLOOD BY AUTOMATED COUNT: 12.9 % (ref 11–15)
GLOBULIN PLAS-MCNC: 3.7 G/DL (ref 2.8–4.4)
GLUCOSE BLD-MCNC: 83 MG/DL (ref 70–99)
GLUCOSE UR-MCNC: NEGATIVE MG/DL
HCT VFR BLD AUTO: 44.7 % (ref 35–48)
HGB BLD-MCNC: 14.4 G/DL (ref 12–16)
HGB UR QL STRIP.AUTO: NEGATIVE
IMM GRANULOCYTES # BLD AUTO: 0.02 X10(3) UL (ref 0–1)
IMM GRANULOCYTES NFR BLD: 0.4 %
KETONES UR-MCNC: NEGATIVE MG/DL
LEUKOCYTE ESTERASE UR QL STRIP.AUTO: NEGATIVE
LYMPHOCYTES # BLD AUTO: 1.53 X10(3) UL (ref 1–4)
LYMPHOCYTES NFR BLD AUTO: 27.3 %
M PROTEIN MFR SERPL ELPH: 7.5 G/DL (ref 6.4–8.2)
MCH RBC QN AUTO: 30.7 PG (ref 26–34)
MCHC RBC AUTO-ENTMCNC: 32.2 G/DL (ref 31–37)
MCV RBC AUTO: 95.3 FL (ref 80–100)
MONOCYTES # BLD AUTO: 0.37 X10(3) UL (ref 0.1–1)
MONOCYTES NFR BLD AUTO: 6.6 %
NEUTROPHILS # BLD AUTO: 3.58 X10 (3) UL (ref 1.5–7.7)
NEUTROPHILS # BLD AUTO: 3.58 X10(3) UL (ref 1.5–7.7)
NEUTROPHILS NFR BLD AUTO: 63.9 %
NITRITE UR QL STRIP.AUTO: NEGATIVE
OSMOLALITY SERPL CALC.SUM OF ELEC: 289 MOSM/KG (ref 275–295)
PATIENT FASTING: YES
PH UR: 5 [PH] (ref 5–8)
PLATELET # BLD AUTO: 204 10(3)UL (ref 150–450)
PROT UR-MCNC: NEGATIVE MG/DL
RBC # BLD AUTO: 4.69 X10(6)UL (ref 3.8–5.3)
RHEUMATOID FACT SERPL-ACNC: <10 IU/ML (ref ?–15)
SODIUM SERPL-SCNC: 140 MMOL/L (ref 136–145)
SP GR UR STRIP: 1.02 (ref 1–1.03)
UROBILINOGEN UR STRIP-ACNC: <2
VIT C UR-MCNC: NEGATIVE MG/DL
WBC # BLD AUTO: 5.6 X10(3) UL (ref 4–11)

## 2019-09-06 PROCEDURE — 81003 URINALYSIS AUTO W/O SCOPE: CPT

## 2019-09-06 PROCEDURE — 80053 COMPREHEN METABOLIC PANEL: CPT

## 2019-09-06 PROCEDURE — 36415 COLL VENOUS BLD VENIPUNCTURE: CPT

## 2019-09-06 PROCEDURE — 86235 NUCLEAR ANTIGEN ANTIBODY: CPT

## 2019-09-06 PROCEDURE — 85025 COMPLETE CBC W/AUTO DIFF WBC: CPT

## 2019-09-06 PROCEDURE — 86039 ANTINUCLEAR ANTIBODIES (ANA): CPT

## 2019-09-06 PROCEDURE — 86431 RHEUMATOID FACTOR QUANT: CPT

## 2019-09-06 PROCEDURE — 86225 DNA ANTIBODY NATIVE: CPT

## 2019-09-06 PROCEDURE — 86038 ANTINUCLEAR ANTIBODIES: CPT

## 2019-09-06 PROCEDURE — 86140 C-REACTIVE PROTEIN: CPT

## 2019-09-09 LAB — NUCLEAR IGG TITR SER IF: POSITIVE {TITER}

## 2019-09-10 LAB
ANA NUCLEOLAR TITR SER IF: 320 {TITER}
DSDNA AB TITR SER: <10 {TITER}
ENA SM IGG SER QL: NEGATIVE
ENA SM+RNP AB SER QL: NEGATIVE
ENA SS-A AB SER QL IA: NEGATIVE
ENA SS-B AB SER QL IA: NEGATIVE

## 2019-09-12 ENCOUNTER — OFFICE VISIT (OUTPATIENT)
Dept: RHEUMATOLOGY | Facility: CLINIC | Age: 34
End: 2019-09-12
Payer: COMMERCIAL

## 2019-09-12 VITALS
HEART RATE: 91 BPM | HEIGHT: 61.5 IN | SYSTOLIC BLOOD PRESSURE: 112 MMHG | BODY MASS INDEX: 38.02 KG/M2 | WEIGHT: 204 LBS | DIASTOLIC BLOOD PRESSURE: 70 MMHG

## 2019-09-12 DIAGNOSIS — R76.8 POSITIVE ANA (ANTINUCLEAR ANTIBODY): Primary | ICD-10-CM

## 2019-09-12 DIAGNOSIS — R21 RASH: ICD-10-CM

## 2019-09-12 PROCEDURE — 99244 OFF/OP CNSLTJ NEW/EST MOD 40: CPT | Performed by: INTERNAL MEDICINE

## 2019-09-12 NOTE — PROGRESS NOTES
Gladys Bates is a 27year old female who presents for Patient presents with:  Consult  Abnormal Labs  .    HPI:   CC: +BRENDAN  Consult: referred by dermatology Dr. Kevin Castro    This is a 26 yo F with hx of Chronic sinusitis referred by dermatology for + BRENDAN kg)    Body mass index is 37.92 kg/m².         Current Outpatient Medications:  PHENTERMINE HCL 15 MG Oral Cap take 1 capsule every morning (Patient taking differently: 37.5 mg.  ) Disp: 30 capsule Rfl: 0   Terbinafine HCl 250 MG Oral Tab Take 250 mg by richi CVS: No chest pain, no heart disease  RS: No SOB, no Cough, No Pleurtic pain,   GI: No nausea, no vomiiting, no abominal pain, no hx of ulcer, no gastritis, no heartburn, no dyshpagia, no BRBPR or melena  : no dysuria, no hx of miscarriages, no DVT Hx, Negative   Bilirubin      Negative Negative   Blood Urine      Negative Negative   Nitrite Urine      Negative Negative   Urobilinogen Urine      <2.0 <2.0   Leukocyte Esterase Urine      Negative Negative   ASCORBIC ACID      Negative mg/dL Negative   Andrey 0.55 - 1.02 mg/dL 0.93   BUN/CREAT Ratio      10.0 - 20.0 14.0   CALCIUM      8.5 - 10.1 mg/dL 8.9   CALCULATED OSMOLALITY      275 - 295 mOsm/kg 289   eGFR NON-AFR.  AMERICAN      >=60 80   eGFR       >=60 92   ALT (SGPT)      13 - 56 U/

## 2019-09-28 ENCOUNTER — OFFICE VISIT (OUTPATIENT)
Dept: INTERNAL MEDICINE CLINIC | Facility: CLINIC | Age: 34
End: 2019-09-28
Payer: COMMERCIAL

## 2019-09-28 ENCOUNTER — HOSPITAL ENCOUNTER (OUTPATIENT)
Dept: GENERAL RADIOLOGY | Facility: HOSPITAL | Age: 34
Discharge: HOME | End: 2019-09-28
Attending: INTERNAL MEDICINE
Payer: COMMERCIAL

## 2019-09-28 VITALS
TEMPERATURE: 99 F | WEIGHT: 203.81 LBS | HEIGHT: 61.5 IN | BODY MASS INDEX: 37.99 KG/M2 | DIASTOLIC BLOOD PRESSURE: 72 MMHG | OXYGEN SATURATION: 99 % | RESPIRATION RATE: 18 BRPM | SYSTOLIC BLOOD PRESSURE: 120 MMHG | HEART RATE: 72 BPM

## 2019-09-28 DIAGNOSIS — Z51.81 THERAPEUTIC DRUG MONITORING: Primary | ICD-10-CM

## 2019-09-28 DIAGNOSIS — L66.9 SCARRING ALOPECIA: ICD-10-CM

## 2019-09-28 DIAGNOSIS — M54.50 RIGHT LOW BACK PAIN, UNSPECIFIED CHRONICITY, UNSPECIFIED WHETHER SCIATICA PRESENT: ICD-10-CM

## 2019-09-28 DIAGNOSIS — J34.89 NASAL SORE: ICD-10-CM

## 2019-09-28 DIAGNOSIS — E66.9 CLASS 2 OBESITY WITHOUT SERIOUS COMORBIDITY WITH BODY MASS INDEX (BMI) OF 37.0 TO 37.9 IN ADULT, UNSPECIFIED OBESITY TYPE: ICD-10-CM

## 2019-09-28 DIAGNOSIS — Z83.49 FAMILY HISTORY OF THYROID DISEASE: ICD-10-CM

## 2019-09-28 LAB
T4 FREE SERPL-MCNC: 1.1 NG/DL (ref 0.8–1.7)
THYROPEROXIDASE AB SERPL-ACNC: <28 U/ML (ref ?–60)
TSI SER-ACNC: 1.54 MIU/ML (ref 0.36–3.74)

## 2019-09-28 PROCEDURE — 84439 ASSAY OF FREE THYROXINE: CPT | Performed by: INTERNAL MEDICINE

## 2019-09-28 PROCEDURE — 72110 X-RAY EXAM L-2 SPINE 4/>VWS: CPT | Performed by: INTERNAL MEDICINE

## 2019-09-28 PROCEDURE — 84443 ASSAY THYROID STIM HORMONE: CPT | Performed by: INTERNAL MEDICINE

## 2019-09-28 PROCEDURE — 86376 MICROSOMAL ANTIBODY EACH: CPT | Performed by: INTERNAL MEDICINE

## 2019-09-28 PROCEDURE — 99214 OFFICE O/P EST MOD 30 MIN: CPT | Performed by: INTERNAL MEDICINE

## 2019-09-28 NOTE — PATIENT INSTRUCTIONS
Bupropion; Naltrexone extended-release tablets  Brand Name: Hamp Gilford  What is this medicine?   BUPROPION; NALTREXONE (byoo PROE pee on; nal TREX one) is a combination product used to promote and maintain weight loss in obese adults or overweight adults wh · signs and symptoms of liver injury like dark yellow or brown urine; general ill feeling or flu-like symptoms; light-colored stools; loss of appetite; nausea; right upper belly pain; unusually weak or tired; yellowing of the eyes or skin  · suicidal thoug If you miss a dose, skip the missed dose and take your next tablet at the regular time. Do not take double or extra doses. Where should I keep my medicine? Keep out of the reach of children.   Store at room temperature between 15 and 30 degrees C (59 and This medicine may affect blood sugar levels. If you have diabetes, check with your doctor or health care professional before you change your diet or the dose of your diabetic medicine.   Patients and their families should watch out for new or worsening depr

## 2019-09-28 NOTE — PROGRESS NOTES
Ralston Mail is a 27year old female.     Chief complaint:weight loss follow up , medication refill, therapeutic drug monitoring    HPI:   HERI here for follow up on weight loss   Wt Readings from Last 12 Encounters:  09/28/19 : 203 lb 12.8 oz  09/12/19 : Disp:  Rfl:    ergocalciferol 19284 units Oral Cap  Disp:  Rfl:       Past Medical History:   Diagnosis Date   • Anxiety state, unspecified    • Depression    • Diverticulosis    • Enlargement of lymph nodes    • Environmental allergies    • Gallstones are clear  NECK: supple,no adenopathy  LUNGS: clear to auscultation  CARDIO: RRR without murmur  GI: good BS's,no masses, HSM or tenderness  EXTREMITIES: no cyanosis, clubbing or edema  NEURO: no gross deficits              No orders of the defined types w active weight loss)  4. Behavior:  Motivational interviewing performed      5. Discussed strategies to overcome habits/challenges       Reviewed:  1. Nutrition and the importance of regular protein intake  2. Labs ordered:    Yes   3.  Hidden CHO/carbohydra

## 2019-10-01 ENCOUNTER — TELEPHONE (OUTPATIENT)
Dept: INTERNAL MEDICINE CLINIC | Facility: CLINIC | Age: 34
End: 2019-10-01

## 2019-10-01 RX ORDER — MAGNESIUM 200 MG
TABLET ORAL
Qty: 90 TABLET | Refills: 0 | Status: SHIPPED | OUTPATIENT
Start: 2019-10-01

## 2019-10-01 NOTE — TELEPHONE ENCOUNTER
Pt was prescribed Naltrexone-bupropion and she states that it's way too expensive. Pt was wondering if there are any coupons that the doctor might have?   Pt has already looked online and states that the only thing she sees will only bring the cost down to

## 2019-10-07 ENCOUNTER — TELEPHONE (OUTPATIENT)
Dept: INTERNAL MEDICINE CLINIC | Facility: CLINIC | Age: 34
End: 2019-10-07

## 2019-10-07 NOTE — TELEPHONE ENCOUNTER
Pt is returning Dr Darcy Preez call. Pt states that Dr Filiberto Bauman was going to discuss some of her medication options over the phone.

## 2019-10-08 ENCOUNTER — TELEPHONE (OUTPATIENT)
Dept: INTERNAL MEDICINE CLINIC | Facility: CLINIC | Age: 34
End: 2019-10-08

## 2019-10-08 NOTE — TELEPHONE ENCOUNTER
The patient would like to please talk to Dr. Imtiaz Perez regarding her medication. And the cost of the medication.

## 2019-10-11 ENCOUNTER — TELEPHONE (OUTPATIENT)
Dept: INTERNAL MEDICINE CLINIC | Facility: CLINIC | Age: 34
End: 2019-10-11

## 2019-10-11 NOTE — TELEPHONE ENCOUNTER
Called pharmacy. Informed them that patient told us the [facility] was much too expensive for her; hence Jerris Severin was ordered.

## 2019-11-23 ENCOUNTER — OFFICE VISIT (OUTPATIENT)
Dept: INTERNAL MEDICINE CLINIC | Facility: CLINIC | Age: 34
End: 2019-11-23
Payer: COMMERCIAL

## 2019-11-23 VITALS
OXYGEN SATURATION: 98 % | HEIGHT: 61.5 IN | HEART RATE: 72 BPM | RESPIRATION RATE: 17 BRPM | DIASTOLIC BLOOD PRESSURE: 82 MMHG | WEIGHT: 210.38 LBS | TEMPERATURE: 99 F | SYSTOLIC BLOOD PRESSURE: 112 MMHG | BODY MASS INDEX: 39.21 KG/M2

## 2019-11-23 DIAGNOSIS — E53.8 LOW VITAMIN B12 LEVEL: ICD-10-CM

## 2019-11-23 DIAGNOSIS — E66.01 MORBID OBESITY (HCC): ICD-10-CM

## 2019-11-23 DIAGNOSIS — F43.9 STRESS: ICD-10-CM

## 2019-11-23 DIAGNOSIS — Z51.81 THERAPEUTIC DRUG MONITORING: Primary | ICD-10-CM

## 2019-11-23 PROCEDURE — 96372 THER/PROPH/DIAG INJ SC/IM: CPT | Performed by: INTERNAL MEDICINE

## 2019-11-23 PROCEDURE — 99214 OFFICE O/P EST MOD 30 MIN: CPT | Performed by: INTERNAL MEDICINE

## 2019-11-23 RX ORDER — PHENTERMINE HYDROCHLORIDE 15 MG/1
15 CAPSULE ORAL EVERY MORNING
Qty: 30 CAPSULE | Refills: 0 | Status: SHIPPED | OUTPATIENT
Start: 2019-11-23 | End: 2019-12-23

## 2019-11-23 RX ORDER — CYANOCOBALAMIN 1000 UG/ML
1000 INJECTION INTRAMUSCULAR; SUBCUTANEOUS ONCE
Status: COMPLETED | OUTPATIENT
Start: 2019-11-23 | End: 2019-11-23

## 2019-11-23 RX ORDER — BUPROPION HYDROCHLORIDE 150 MG/1
150 TABLET, EXTENDED RELEASE ORAL 2 TIMES DAILY
Qty: 60 TABLET | Refills: 0 | Status: SHIPPED | OUTPATIENT
Start: 2019-11-23 | End: 2020-01-25 | Stop reason: ALTCHOICE

## 2019-11-23 RX ADMIN — CYANOCOBALAMIN 1000 MCG: 1000 INJECTION INTRAMUSCULAR; SUBCUTANEOUS at 08:39:00

## 2019-11-23 NOTE — PROGRESS NOTES
Amanda Persaud is a 27year old female.     Chief complaint:weight loss follow up , medication refill, therapeutic drug monitoring    HPI:   HERI here for follow up on weight loss   Wt Readings from Last 12 Encounters:  11/23/19 : 210 lb 6.4 oz (95.4 kg)  09 • Obesity, unspecified    • Positive BRENDAN (antinuclear antibody)    • Rectal bleeding    • Seborrhea    • Visual impairment     glasses/contacts     Past Surgical History:   Procedure Laterality Date   • COLONOSCOPY  2014    diverticulitis   • LAPAROSCOPI No orders of the defined types were placed in this encounter.         ASSESSMENT/PLAN:   (Z51.81) Therapeutic drug monitoring  (primary encounter diagnosis)    (E66.01) Morbid obesity (HCC)    (E53.8) Low vitamin B12 level    (F43.9) Stress    Plan:  ·

## 2019-11-23 NOTE — PATIENT INSTRUCTIONS
Bupropion sustained-release tablets (Depression/Mood Disorders)  Brand Names: Budeprion SR, Wellbutrin SR  What is this medicine? BUPROPION (byoo PROE pee on) is used to treat depression. How should I use this medicine?   Take this medicine by mouth wit Side effects that usually do not require medical attention (report to your doctor or health care professional if they continue or are bothersome):  · constipation  · headache  · loss of appetite  · nausea  · tremors  · weight loss  What may interact with t What should I tell my health care provider before I take this medicine?   They need to know if you have any of these conditions:  · an eating disorder, such as anorexia or bulimia  · bipolar disorder or psychosis  · diabetes or high blood sugar, treated wit Your mouth may get dry. Chewing sugarless gum or sucking hard candy, and drinking plenty of water may help. Contact your doctor if the problem does not go away or is severe. NOTE:This sheet is a summary. It may not cover all possible information.  If you h

## 2019-12-14 ENCOUNTER — OFFICE VISIT (OUTPATIENT)
Dept: INTERNAL MEDICINE CLINIC | Facility: CLINIC | Age: 34
End: 2019-12-14
Payer: COMMERCIAL

## 2019-12-14 VITALS
BODY MASS INDEX: 38.36 KG/M2 | WEIGHT: 205.81 LBS | OXYGEN SATURATION: 98 % | TEMPERATURE: 98 F | HEART RATE: 86 BPM | DIASTOLIC BLOOD PRESSURE: 84 MMHG | HEIGHT: 61.5 IN | SYSTOLIC BLOOD PRESSURE: 112 MMHG | RESPIRATION RATE: 17 BRPM

## 2019-12-14 DIAGNOSIS — Z51.81 THERAPEUTIC DRUG MONITORING: Primary | ICD-10-CM

## 2019-12-14 DIAGNOSIS — H92.02 LEFT EAR PAIN: ICD-10-CM

## 2019-12-14 DIAGNOSIS — E66.01 MORBID OBESITY (HCC): ICD-10-CM

## 2019-12-14 DIAGNOSIS — J06.9 URTI (ACUTE UPPER RESPIRATORY INFECTION): ICD-10-CM

## 2019-12-14 PROCEDURE — 99213 OFFICE O/P EST LOW 20 MIN: CPT | Performed by: INTERNAL MEDICINE

## 2019-12-14 RX ORDER — PHENTERMINE HYDROCHLORIDE 30 MG/1
30 CAPSULE ORAL EVERY MORNING
Qty: 30 CAPSULE | Refills: 0 | Status: SHIPPED | OUTPATIENT
Start: 2019-12-14 | End: 2020-01-13

## 2019-12-14 RX ORDER — BUPROPION HYDROCHLORIDE 300 MG/1
300 TABLET ORAL DAILY
Qty: 30 TABLET | Refills: 0 | Status: SHIPPED | OUTPATIENT
Start: 2019-12-14 | End: 2020-02-05

## 2019-12-14 RX ORDER — AMOXICILLIN AND CLAVULANATE POTASSIUM 875; 125 MG/1; MG/1
1 TABLET, FILM COATED ORAL 2 TIMES DAILY
Qty: 20 TABLET | Refills: 0 | Status: SHIPPED | OUTPATIENT
Start: 2019-12-14 | End: 2019-12-21

## 2019-12-14 NOTE — PROGRESS NOTES
Thom Fragoso is a 27year old female.     Chief complaint:weight loss follow up , medication refill, therapeutic drug monitoring    HPI:   HERI here for follow up on weight loss   Wt Readings from Last 12 Encounters:  12/14/19 : 208 lb 12.8 oz (94.7 kg)  1 • ergocalciferol 28733 units Oral Cap         Past Medical History:   Diagnosis Date   • Anxiety state, unspecified    • Depression    • Diverticulosis    • Enlargement of lymph nodes    • Environmental allergies    • Gallstones    • Hemorrhoids    • Obe and throat erythematous   NECK: supple,no adenopathy  LUNGS: clear to auscultation  CARDIO: RRR without murmur  GI: good BS's,no masses, HSM or tenderness  EXTREMITIES: no cyanosis, clubbing or edema  NEURO: no gross deficits              No orders of the

## 2020-01-25 ENCOUNTER — OFFICE VISIT (OUTPATIENT)
Dept: INTERNAL MEDICINE CLINIC | Facility: CLINIC | Age: 35
End: 2020-01-25
Payer: COMMERCIAL

## 2020-01-25 VITALS
DIASTOLIC BLOOD PRESSURE: 80 MMHG | SYSTOLIC BLOOD PRESSURE: 118 MMHG | OXYGEN SATURATION: 99 % | BODY MASS INDEX: 34.88 KG/M2 | TEMPERATURE: 98 F | HEIGHT: 64.5 IN | WEIGHT: 206.81 LBS | HEART RATE: 74 BPM | RESPIRATION RATE: 17 BRPM

## 2020-01-25 DIAGNOSIS — R53.83 OTHER FATIGUE: ICD-10-CM

## 2020-01-25 DIAGNOSIS — E66.01 MORBID OBESITY (HCC): ICD-10-CM

## 2020-01-25 DIAGNOSIS — N83.201 CYST OF RIGHT OVARY: ICD-10-CM

## 2020-01-25 DIAGNOSIS — Z51.81 THERAPEUTIC DRUG MONITORING: Primary | ICD-10-CM

## 2020-01-25 DIAGNOSIS — R10.9 ABDOMINAL PAIN, UNSPECIFIED ABDOMINAL LOCATION: ICD-10-CM

## 2020-01-25 LAB
ALBUMIN SERPL-MCNC: 3.9 G/DL (ref 3.4–5)
ALBUMIN/GLOB SERPL: 1.1 {RATIO} (ref 1–2)
ALP LIVER SERPL-CCNC: 51 U/L (ref 37–98)
ALT SERPL-CCNC: 22 U/L (ref 13–56)
ANION GAP SERPL CALC-SCNC: 5 MMOL/L (ref 0–18)
AST SERPL-CCNC: 10 U/L (ref 15–37)
BASOPHILS # BLD AUTO: 0.03 X10(3) UL (ref 0–0.2)
BASOPHILS NFR BLD AUTO: 0.5 %
BILIRUB SERPL-MCNC: 0.3 MG/DL (ref 0.1–2)
BILIRUB UR QL: NEGATIVE
BUN BLD-MCNC: 10 MG/DL (ref 7–18)
BUN/CREAT SERPL: 12.7 (ref 10–20)
CALCIUM BLD-MCNC: 8.7 MG/DL (ref 8.5–10.1)
CHLORIDE SERPL-SCNC: 106 MMOL/L (ref 98–112)
CLARITY UR: CLEAR
CO2 SERPL-SCNC: 28 MMOL/L (ref 21–32)
COLOR UR: YELLOW
CREAT BLD-MCNC: 0.79 MG/DL (ref 0.55–1.02)
DEPRECATED HBV CORE AB SER IA-ACNC: 96.3 NG/ML (ref 12–160)
DEPRECATED RDW RBC AUTO: 45.9 FL (ref 35.1–46.3)
EOSINOPHIL # BLD AUTO: 0.07 X10(3) UL (ref 0–0.7)
EOSINOPHIL NFR BLD AUTO: 1.2 %
ERYTHROCYTE [DISTWIDTH] IN BLOOD BY AUTOMATED COUNT: 13 % (ref 11–15)
GLOBULIN PLAS-MCNC: 3.7 G/DL (ref 2.8–4.4)
GLUCOSE BLD-MCNC: 75 MG/DL (ref 70–99)
GLUCOSE UR-MCNC: NEGATIVE MG/DL
HCT VFR BLD AUTO: 43.1 % (ref 35–48)
HGB BLD-MCNC: 14.1 G/DL (ref 12–16)
HGB UR QL STRIP.AUTO: NEGATIVE
IMM GRANULOCYTES # BLD AUTO: 0.01 X10(3) UL (ref 0–1)
IMM GRANULOCYTES NFR BLD: 0.2 %
IRON SATURATION: 35 % (ref 15–50)
IRON SERPL-MCNC: 134 UG/DL (ref 50–170)
LEUKOCYTE ESTERASE UR QL STRIP.AUTO: NEGATIVE
LYMPHOCYTES # BLD AUTO: 1.81 X10(3) UL (ref 1–4)
LYMPHOCYTES NFR BLD AUTO: 30.6 %
M PROTEIN MFR SERPL ELPH: 7.6 G/DL (ref 6.4–8.2)
MCH RBC QN AUTO: 31.3 PG (ref 26–34)
MCHC RBC AUTO-ENTMCNC: 32.7 G/DL (ref 31–37)
MCV RBC AUTO: 95.6 FL (ref 80–100)
MONOCYTES # BLD AUTO: 0.33 X10(3) UL (ref 0.1–1)
MONOCYTES NFR BLD AUTO: 5.6 %
NEUTROPHILS # BLD AUTO: 3.66 X10 (3) UL (ref 1.5–7.7)
NEUTROPHILS # BLD AUTO: 3.66 X10(3) UL (ref 1.5–7.7)
NEUTROPHILS NFR BLD AUTO: 61.9 %
NITRITE UR QL STRIP.AUTO: NEGATIVE
OSMOLALITY SERPL CALC.SUM OF ELEC: 286 MOSM/KG (ref 275–295)
PATIENT FASTING Y/N/NP: YES
PH UR: 5 [PH] (ref 5–8)
PLATELET # BLD AUTO: 210 10(3)UL (ref 150–450)
POTASSIUM SERPL-SCNC: 3.8 MMOL/L (ref 3.5–5.1)
PROT UR-MCNC: 30 MG/DL
RBC # BLD AUTO: 4.51 X10(6)UL (ref 3.8–5.3)
RBC #/AREA URNS AUTO: 1 /HPF
SODIUM SERPL-SCNC: 139 MMOL/L (ref 136–145)
SP GR UR STRIP: 1.03 (ref 1–1.03)
TOTAL IRON BINDING CAPACITY: 381 UG/DL (ref 240–450)
TRANSFERRIN SERPL-MCNC: 256 MG/DL (ref 200–360)
UROBILINOGEN UR STRIP-ACNC: <2
VIT B12 SERPL-MCNC: 576 PG/ML (ref 193–986)
WBC # BLD AUTO: 5.9 X10(3) UL (ref 4–11)
WBC #/AREA URNS AUTO: 1 /HPF

## 2020-01-25 PROCEDURE — 96372 THER/PROPH/DIAG INJ SC/IM: CPT | Performed by: INTERNAL MEDICINE

## 2020-01-25 PROCEDURE — 81001 URINALYSIS AUTO W/SCOPE: CPT | Performed by: INTERNAL MEDICINE

## 2020-01-25 PROCEDURE — 82728 ASSAY OF FERRITIN: CPT | Performed by: INTERNAL MEDICINE

## 2020-01-25 PROCEDURE — 82607 VITAMIN B-12: CPT | Performed by: INTERNAL MEDICINE

## 2020-01-25 PROCEDURE — 83540 ASSAY OF IRON: CPT | Performed by: INTERNAL MEDICINE

## 2020-01-25 PROCEDURE — 84466 ASSAY OF TRANSFERRIN: CPT | Performed by: INTERNAL MEDICINE

## 2020-01-25 PROCEDURE — 85025 COMPLETE CBC W/AUTO DIFF WBC: CPT | Performed by: INTERNAL MEDICINE

## 2020-01-25 PROCEDURE — 99214 OFFICE O/P EST MOD 30 MIN: CPT | Performed by: INTERNAL MEDICINE

## 2020-01-25 PROCEDURE — 80053 COMPREHEN METABOLIC PANEL: CPT | Performed by: INTERNAL MEDICINE

## 2020-01-25 RX ORDER — PHENTERMINE HYDROCHLORIDE 37.5 MG/1
37.5 TABLET ORAL
Qty: 30 TABLET | Refills: 0 | Status: SHIPPED | OUTPATIENT
Start: 2020-01-25 | End: 2020-02-24

## 2020-01-25 RX ORDER — CYANOCOBALAMIN 1000 UG/ML
1000 INJECTION INTRAMUSCULAR; SUBCUTANEOUS ONCE
Status: COMPLETED | OUTPATIENT
Start: 2020-01-25 | End: 2020-01-25

## 2020-01-25 RX ADMIN — CYANOCOBALAMIN 1000 MCG: 1000 INJECTION INTRAMUSCULAR; SUBCUTANEOUS at 11:07:00

## 2020-01-25 NOTE — PROGRESS NOTES
Kunal Handy is a 27year old female.     Chief complaint:weight loss follow up , medication refill, therapeutic drug monitoring, fatigue , abdominal pain    HPI:   HERI here for follow up on weight loss   Wt Readings from Last 12 Encounters:  01/25/20 : 2 tablet 0   • VITAMIN B-12 1000 MCG Sublingual SL Tab Place 1 tablet under the tongue daily.  90 tablet 0   • PHENTERMINE HCL 15 MG Oral Cap take 1 capsule every morning (Patient taking differently: 37.5 mg.  ) 30 capsule 0   • Norgestimate-Eth Estradiol (MO completed.   Pertinent positives and negatives noted in the the HPI          PHYSICAL EXAM:   /80   Pulse 74   Temp 98 °F (36.7 °C) (Oral)   Resp 17   Ht 64.5\"   Wt 206 lb 12.8 oz (93.8 kg)   LMP 01/11/2020 (Approximate)   SpO2 99%   BMI 34.95 kg/m² is 90 g per day   · Discussed the importance of exercise for weight loss and weight maintenance goal is 1 hour 3 times per week   · Increase water intake   · Discussed weight loss challenges   · Continue Wellbutrin   · Increase phentermine to 37.5   · Advi

## 2020-01-25 NOTE — PROGRESS NOTES
When given the B12 vaccine RD patient had a instant bump. Advised to ice it when she gets home. Voiced understanding.  MAIK SUAREZ

## 2020-01-27 ENCOUNTER — TELEPHONE (OUTPATIENT)
Dept: INTERNAL MEDICINE CLINIC | Facility: CLINIC | Age: 35
End: 2020-01-27

## 2020-01-27 NOTE — TELEPHONE ENCOUNTER
Patient was  In the office 01/25/20. Was given the B12 shot RD and pt had a hematoma reaction, bruised real quickly. Called pt to f/u says she's fine just has a bruise.

## 2020-02-03 DIAGNOSIS — E66.01 MORBID OBESITY (HCC): ICD-10-CM

## 2020-02-03 DIAGNOSIS — Z51.81 THERAPEUTIC DRUG MONITORING: ICD-10-CM

## 2020-02-03 DIAGNOSIS — H92.02 LEFT EAR PAIN: ICD-10-CM

## 2020-02-03 DIAGNOSIS — J06.9 URTI (ACUTE UPPER RESPIRATORY INFECTION): ICD-10-CM

## 2020-02-05 RX ORDER — BUPROPION HYDROCHLORIDE 300 MG/1
TABLET ORAL
Qty: 30 TABLET | Refills: 0 | Status: SHIPPED | OUTPATIENT
Start: 2020-02-05 | End: 2020-02-28

## 2020-02-15 ENCOUNTER — HOSPITAL ENCOUNTER (OUTPATIENT)
Dept: ULTRASOUND IMAGING | Age: 35
Discharge: HOME | End: 2020-02-15
Attending: INTERNAL MEDICINE
Payer: COMMERCIAL

## 2020-02-15 DIAGNOSIS — E66.01 MORBID OBESITY (HCC): ICD-10-CM

## 2020-02-15 DIAGNOSIS — Z51.81 THERAPEUTIC DRUG MONITORING: ICD-10-CM

## 2020-02-15 DIAGNOSIS — R53.83 OTHER FATIGUE: ICD-10-CM

## 2020-02-15 DIAGNOSIS — N83.201 CYST OF RIGHT OVARY: ICD-10-CM

## 2020-02-15 DIAGNOSIS — R10.9 ABDOMINAL PAIN, UNSPECIFIED ABDOMINAL LOCATION: ICD-10-CM

## 2020-02-15 PROCEDURE — 76830 TRANSVAGINAL US NON-OB: CPT | Performed by: INTERNAL MEDICINE

## 2020-02-15 PROCEDURE — 76856 US EXAM PELVIC COMPLETE: CPT | Performed by: INTERNAL MEDICINE

## 2020-02-25 ENCOUNTER — OFFICE VISIT (OUTPATIENT)
Dept: OBGYN CLINIC | Facility: CLINIC | Age: 35
End: 2020-02-25
Payer: COMMERCIAL

## 2020-02-25 VITALS
BODY MASS INDEX: 35 KG/M2 | HEART RATE: 87 BPM | DIASTOLIC BLOOD PRESSURE: 83 MMHG | SYSTOLIC BLOOD PRESSURE: 118 MMHG | WEIGHT: 209.63 LBS

## 2020-02-25 DIAGNOSIS — Z01.419 ENCOUNTER FOR GYNECOLOGICAL EXAMINATION WITHOUT ABNORMAL FINDING: Primary | ICD-10-CM

## 2020-02-25 DIAGNOSIS — Z30.41 ENCOUNTER FOR BIRTH CONTROL PILLS MAINTENANCE: ICD-10-CM

## 2020-02-25 PROCEDURE — 99395 PREV VISIT EST AGE 18-39: CPT | Performed by: OBSTETRICS & GYNECOLOGY

## 2020-02-25 RX ORDER — NORGESTIMATE AND ETHINYL ESTRADIOL 0.25-0.035
KIT ORAL
Qty: 3 PACKAGE | Refills: 3 | Status: SHIPPED | OUTPATIENT
Start: 2020-02-25 | End: 2020-05-02

## 2020-02-25 NOTE — PROGRESS NOTES
Well Woman Exam    HPI:  The patient is a 30yo female who presents today for annual exam. She has no complaints. She is getting  9/19/2020. She is thinking of going to [place] for her honeymoon in the following spring. She is happy with OCPs.    She d standard drinks        Types: 4 Glasses of wine per week        Comment: Per pt socially drink on weekends      Drug use: No      Sexual activity: Yes        Birth control/protection: OCP    Lifestyle      Physical activity:        Days per week: Not on fi 0    ALLERGIES:  No Known Allergies      Review of Systems:  Constitutional:  Denies fevers and chills   Cardiovascular:  denies chest pain or palpitations  Respiratory:  denies shortness of breath  Gastrointestinal:  denies nausea, vomiting, diarrhea and 40; however, ACOG encourages shared decision making with the patient and together we reach appropriate screening intervals for the individual  2. Reviewed breast self awareness   4.  Follow up in 1 year

## 2020-02-28 DIAGNOSIS — H92.02 LEFT EAR PAIN: ICD-10-CM

## 2020-02-28 DIAGNOSIS — E66.01 MORBID OBESITY (HCC): ICD-10-CM

## 2020-02-28 DIAGNOSIS — Z51.81 THERAPEUTIC DRUG MONITORING: ICD-10-CM

## 2020-02-28 DIAGNOSIS — J06.9 URTI (ACUTE UPPER RESPIRATORY INFECTION): ICD-10-CM

## 2020-02-28 RX ORDER — BUPROPION HYDROCHLORIDE 300 MG/1
TABLET ORAL
Qty: 90 TABLET | Refills: 0 | Status: SHIPPED | OUTPATIENT
Start: 2020-02-28 | End: 2020-06-01

## 2020-03-06 ENCOUNTER — OFFICE VISIT (OUTPATIENT)
Dept: FAMILY MEDICINE CLINIC | Facility: CLINIC | Age: 35
End: 2020-03-06
Payer: COMMERCIAL

## 2020-03-06 VITALS
HEART RATE: 91 BPM | DIASTOLIC BLOOD PRESSURE: 72 MMHG | TEMPERATURE: 98 F | OXYGEN SATURATION: 100 % | BODY MASS INDEX: 36.68 KG/M2 | WEIGHT: 207 LBS | HEIGHT: 63 IN | RESPIRATION RATE: 14 BRPM | SYSTOLIC BLOOD PRESSURE: 130 MMHG

## 2020-03-06 DIAGNOSIS — J04.0 LARYNGITIS, ACUTE: Primary | ICD-10-CM

## 2020-03-06 PROCEDURE — 99212 OFFICE O/P EST SF 10 MIN: CPT | Performed by: NURSE PRACTITIONER

## 2020-03-06 NOTE — PROGRESS NOTES
CHIEF COMPLAINT:   Patient presents with:  Voice Problem      HPI:   Ronald Arana is a 27year old female who presents for upper respiratory symptoms for  4 days.  Patient reports symptoms started with sore throat 2-3 days ago, now with cough and loss of v HEENT: See HPI. Denies ear pain, runny nose, nasal congestion, sinus pain/congestion, or headache. LUNGS: denies shortness of breath or wheezing. Cough is dry same throughout day and night.    CARDIOVASCULAR: denies chest pain or palpitations   GI: denies Laryngitis is a swelling of the tissues around the vocal cords. Symptoms include a hoarse (scratchy) voice. Or your voice may be gone for a few days or longer. This may be caused by a viral illness, such as a head or chest cold.  It may also be due to overu Sore throats happen for many reasons, such as colds, allergies, and infections caused by viruses or bacteria. In any case, your throat becomes red and sore.  Your goal for self-care is to reduce your discomfort while giving your throat a chance to heal.  Mo · White spots on the throat  · Great difficulty swallowing  · Trouble breathing  · A skin rash  · Recent exposure to someone else with strep bacteria  · Severe hoarseness and swollen glands in the neck or jaw  Date Last Reviewed: 8/1/2016 © 2000-2019 The

## 2020-03-06 NOTE — PATIENT INSTRUCTIONS
Laryngitis    Laryngitis is a swelling of the tissues around the vocal cords. Symptoms include a hoarse (scratchy) voice. Or your voice may be gone for a few days or longer. This may be caused by a viral illness, such as a head or chest cold.  It may also Sore throats happen for many reasons, such as colds, allergies, and infections caused by viruses or bacteria. In any case, your throat becomes red and sore.  Your goal for self-care is to reduce your discomfort while giving your throat a chance to heal.  Mo · White spots on the throat  · Great difficulty swallowing  · Trouble breathing  · A skin rash  · Recent exposure to someone else with strep bacteria  · Severe hoarseness and swollen glands in the neck or jaw  Date Last Reviewed: 8/1/2016 © 2000-2019 The

## 2020-03-24 ENCOUNTER — TELEPHONE (OUTPATIENT)
Dept: INTERNAL MEDICINE CLINIC | Facility: CLINIC | Age: 35
End: 2020-03-24

## 2020-03-24 RX ORDER — PHENTERMINE HYDROCHLORIDE 15 MG/1
15 CAPSULE ORAL EVERY MORNING
Qty: 30 CAPSULE | Refills: 0 | Status: SHIPPED | OUTPATIENT
Start: 2020-03-24 | End: 2021-01-23

## 2020-03-24 NOTE — TELEPHONE ENCOUNTER
Called pt to notify of susan change to Tele-Health visit. Pt did not agree to doing Tele-Health visit, however has agreed to Reschedule her appointment until May. Requesting refill of Phentermine as she is completely out. Please advise.

## 2020-05-02 RX ORDER — NORGESTIMATE AND ETHINYL ESTRADIOL 0.25-0.035
KIT ORAL
Qty: 84 TABLET | Refills: 3 | Status: SHIPPED | OUTPATIENT
Start: 2020-05-02 | End: 2021-04-27

## 2020-05-02 NOTE — TELEPHONE ENCOUNTER
RX WAS SENT AT TIME OF ANNUAL ON 2-25-20 FOR 3 PACKS WITH 3 REFILLS TO [facility]. RX SENT TO NEW [facility] SO PREVIOUS RX CANCELLED.

## 2020-05-23 ENCOUNTER — VIRTUAL PHONE E/M (OUTPATIENT)
Dept: INTERNAL MEDICINE CLINIC | Facility: CLINIC | Age: 35
End: 2020-05-23
Payer: COMMERCIAL

## 2020-05-23 DIAGNOSIS — Z51.81 THERAPEUTIC DRUG MONITORING: Primary | ICD-10-CM

## 2020-05-23 DIAGNOSIS — E66.01 MORBID OBESITY (HCC): ICD-10-CM

## 2020-05-23 DIAGNOSIS — E53.8 LOW VITAMIN B12 LEVEL: ICD-10-CM

## 2020-05-23 PROCEDURE — 99213 OFFICE O/P EST LOW 20 MIN: CPT | Performed by: INTERNAL MEDICINE

## 2020-05-23 RX ORDER — PHENTERMINE HYDROCHLORIDE 37.5 MG/1
37.5 TABLET ORAL
Qty: 60 TABLET | Refills: 0 | Status: SHIPPED | OUTPATIENT
Start: 2020-05-23 | End: 2021-01-23

## 2020-05-23 NOTE — PROGRESS NOTES
Virtual Telephone Check-In    Avani Older verbally consents to a Virtual/Telephone Check-In visit on 05/23/20. Patient has been referred to the [facility] website at [email or web address] to review the yearly Consent to Treat document.     Patient Ky Umaeze HCl 15 MG Oral Cap Take 1 capsule (15 mg total) by mouth every morning.  30 capsule 0   • BUPROPION HCL ER, XL, 300 MG Oral Tablet 24 Hr TAKE 1 TABLET BY MOUTH EVERY DAY 90 tablet 0   • VITAMIN B-12 1000 MCG Sublingual SL Tab Place 1 tablet under the tongue negatives noted in the the HPI          PHYSICAL EXAM:         EXAM was conducted through phone/ audio communication   Current weight per patient is 212 lbs   GENERAL: Speaking full sentence   NEURO: AxOx3  PSYCH:normal mood and affect         No orders of interest of the provider-patient relationship, due to the ongoing public health crises/ national emergency  due to Rose 19 and because of restrictions of visitation. There are limitations of this visit as no in person physical exam could be performed.   Ev

## 2020-05-30 DIAGNOSIS — Z51.81 THERAPEUTIC DRUG MONITORING: ICD-10-CM

## 2020-05-30 DIAGNOSIS — J06.9 URTI (ACUTE UPPER RESPIRATORY INFECTION): ICD-10-CM

## 2020-05-30 DIAGNOSIS — E66.01 MORBID OBESITY (HCC): ICD-10-CM

## 2020-05-30 DIAGNOSIS — H92.02 LEFT EAR PAIN: ICD-10-CM

## 2020-06-01 RX ORDER — BUPROPION HYDROCHLORIDE 300 MG/1
TABLET ORAL
Qty: 90 TABLET | Refills: 0 | Status: SHIPPED | OUTPATIENT
Start: 2020-06-01 | End: 2020-08-30

## 2020-11-17 ENCOUNTER — HOSPITAL ENCOUNTER (OUTPATIENT)
Dept: ULTRASOUND IMAGING | Facility: HOSPITAL | Age: 35
Discharge: HOME | End: 2020-11-17
Attending: INTERNAL MEDICINE
Payer: COMMERCIAL

## 2020-11-17 ENCOUNTER — OFFICE VISIT (OUTPATIENT)
Dept: INTERNAL MEDICINE CLINIC | Facility: CLINIC | Age: 35
End: 2020-11-17
Payer: COMMERCIAL

## 2020-11-17 VITALS
WEIGHT: 226.88 LBS | HEART RATE: 71 BPM | SYSTOLIC BLOOD PRESSURE: 128 MMHG | OXYGEN SATURATION: 99 % | BODY MASS INDEX: 40.2 KG/M2 | DIASTOLIC BLOOD PRESSURE: 80 MMHG | HEIGHT: 63 IN

## 2020-11-17 DIAGNOSIS — Z51.81 THERAPEUTIC DRUG MONITORING: Primary | ICD-10-CM

## 2020-11-17 DIAGNOSIS — E04.9 ENLARGED THYROID: ICD-10-CM

## 2020-11-17 DIAGNOSIS — E66.01 MORBID OBESITY (HCC): ICD-10-CM

## 2020-11-17 DIAGNOSIS — L66.9 SCARRING ALOPECIA: ICD-10-CM

## 2020-11-17 DIAGNOSIS — R10.11 RUQ PAIN: ICD-10-CM

## 2020-11-17 PROCEDURE — 36415 COLL VENOUS BLD VENIPUNCTURE: CPT | Performed by: INTERNAL MEDICINE

## 2020-11-17 PROCEDURE — 83690 ASSAY OF LIPASE: CPT | Performed by: INTERNAL MEDICINE

## 2020-11-17 PROCEDURE — 3074F SYST BP LT 130 MM HG: CPT | Performed by: INTERNAL MEDICINE

## 2020-11-17 PROCEDURE — 3008F BODY MASS INDEX DOCD: CPT | Performed by: INTERNAL MEDICINE

## 2020-11-17 PROCEDURE — 81001 URINALYSIS AUTO W/SCOPE: CPT | Performed by: INTERNAL MEDICINE

## 2020-11-17 PROCEDURE — 99214 OFFICE O/P EST MOD 30 MIN: CPT | Performed by: INTERNAL MEDICINE

## 2020-11-17 PROCEDURE — 80050 GENERAL HEALTH PANEL: CPT | Performed by: INTERNAL MEDICINE

## 2020-11-17 PROCEDURE — 81025 URINE PREGNANCY TEST: CPT | Performed by: INTERNAL MEDICINE

## 2020-11-17 PROCEDURE — 76536 US EXAM OF HEAD AND NECK: CPT | Performed by: INTERNAL MEDICINE

## 2020-11-17 PROCEDURE — 99072 ADDL SUPL MATRL&STAF TM PHE: CPT | Performed by: INTERNAL MEDICINE

## 2020-11-17 PROCEDURE — 3079F DIAST BP 80-89 MM HG: CPT | Performed by: INTERNAL MEDICINE

## 2020-11-17 RX ORDER — NORGESTIMATE AND ETHINYL ESTRADIOL 0.25-0.035
KIT ORAL
COMMUNITY
Start: 2020-02-25

## 2020-11-17 RX ORDER — KETOCONAZOLE 20 MG/G
CREAM TOPICAL
COMMUNITY
Start: 2020-10-14

## 2020-11-17 RX ORDER — MELATONIN
1000 DAILY
COMMUNITY

## 2020-11-17 RX ORDER — PHENTERMINE HYDROCHLORIDE 15 MG/1
15 CAPSULE ORAL EVERY MORNING
Qty: 30 CAPSULE | Refills: 0 | Status: SHIPPED | OUTPATIENT
Start: 2020-11-17 | End: 2021-01-23

## 2020-11-17 NOTE — PATIENT INSTRUCTIONS
Why We Gain Weight When Dionicio Fickle Stressed—And How Not To  The psychology and biology of stress-related overeating and weight gain   Emotional Eating under Stress  Have you ever found yourself mindlessly eating a tub of ice cream while you brood about your lat Today’s human, who sits on the couch worrying about how to pay the bill or works long hours at the computer to make the deadline, does not work off much energy at all dealing with the stressor!  Unfortunately, we are stuck with a neuroendocrine system that dealing with stress in this way, while 42% reported watching television for more than 2 hours a day to deal with stress.  Being a couch potato also increases the temptation to overeat and is inactive, which means that those extra calories aren’t getting bur more than 40% of us lie awake at night as a result of stress. Research shows that worry is a major cause of insomnia. Our minds are overactive and won’t switch off.  We may also lose sleep because of pulling overnights to cram for exams or writing until the to a yoga class, getting a massage, patting your dog, or making time for friends and family can help to relieve stress without adding on the pounds.  Although you may feel that you don’t have time for leisure activities with looming deadlines, taking time t

## 2020-11-17 NOTE — PROGRESS NOTES
Pauline Sun is a 38year old female.     Chief complaint:weight loss follow up , medication refill, therapeutic drug monitoring, RUQ pain,    HPI:   HERI here for follow up on weight loss   Wt Readings from Last 12 Encounters:  11/17/20 : 226 lb 14.4 oz ( Outpatient Medications   Medication Sig Dispense Refill   • Vitamin D3 25 MCG (1000 UT) Oral Tab Take 1,000 Units by mouth daily.      • Norgestimate-Eth Estradiol 0.25-35 MG-MCG Oral Tab Take one tablet po daily     • MONO-LINYAH 0.25-35 MG-MCG Oral Tab Ta Paternal Grandmother    • Heart Disorder Paternal Grandfather    • Hypertension Mother    • Heart Disorder Maternal Grandmother    • Cancer Other    • Other (jordan casanova recently) Sister      Patient Active Problem List:     Positive BRENDAN (antinuclear a LIPASE  - URINALYSIS WITH CULTURE REFLEX  - TSH W REFLEX TO FREE T4    2. Enlarged thyroid  Us thyroid   - US THYROID ([id number]); Future  - URINE PREGNANCY TEST    3. Scarring alopecia  Referral to another derm for second opinion     4.  Therapeutic drug m

## 2020-11-18 DIAGNOSIS — R82.90 ABNORMAL URINALYSIS: Primary | ICD-10-CM

## 2020-11-18 PROBLEM — Z51.81 THERAPEUTIC DRUG MONITORING: Status: ACTIVE | Noted: 2020-11-18

## 2021-01-23 ENCOUNTER — OFFICE VISIT (OUTPATIENT)
Dept: INTERNAL MEDICINE CLINIC | Facility: CLINIC | Age: 36
End: 2021-01-23
Payer: COMMERCIAL

## 2021-01-23 VITALS
DIASTOLIC BLOOD PRESSURE: 78 MMHG | TEMPERATURE: 98 F | HEIGHT: 63 IN | SYSTOLIC BLOOD PRESSURE: 120 MMHG | HEART RATE: 80 BPM | BODY MASS INDEX: 41.29 KG/M2 | OXYGEN SATURATION: 99 % | WEIGHT: 233 LBS

## 2021-01-23 DIAGNOSIS — E66.01 MORBID OBESITY (HCC): ICD-10-CM

## 2021-01-23 DIAGNOSIS — Z51.81 THERAPEUTIC DRUG MONITORING: Primary | ICD-10-CM

## 2021-01-23 PROCEDURE — 3008F BODY MASS INDEX DOCD: CPT | Performed by: INTERNAL MEDICINE

## 2021-01-23 PROCEDURE — 3078F DIAST BP <80 MM HG: CPT | Performed by: INTERNAL MEDICINE

## 2021-01-23 PROCEDURE — 3074F SYST BP LT 130 MM HG: CPT | Performed by: INTERNAL MEDICINE

## 2021-01-23 PROCEDURE — 99213 OFFICE O/P EST LOW 20 MIN: CPT | Performed by: INTERNAL MEDICINE

## 2021-01-23 RX ORDER — PHENTERMINE HYDROCHLORIDE 30 MG/1
30 CAPSULE ORAL EVERY MORNING
Qty: 30 CAPSULE | Refills: 0 | Status: SHIPPED | OUTPATIENT
Start: 2021-01-23 | End: 2021-03-13

## 2021-01-23 NOTE — PROGRESS NOTES
Nisa Pacheco is a 38year old female.     Chief complaint:weight loss follow up , medication refill, therapeutic drug monitoring, morbid obesity    HPI:   HERI here for follow up on weight loss   Wt Readings from Last 12 Encounters:  01/23/21 : 233 lb (105 under the tongue daily.  (Patient not taking: Reported on 1/23/2021) 90 tablet 0   • PHENTERMINE HCL 15 MG Oral Cap take 1 capsule every morning (Patient not taking: Reported on 1/23/2021) 30 capsule 0      Past Medical History:   Diagnosis Date   • Anxiety 3\" (1.6 m)   Wt 233 lb (105.7 kg)   LMP 01/01/2020   SpO2 99%   BMI 41.27 kg/m²   GENERAL: well developed, well nourished,in no apparent distress  SKIN: no rashes,no suspicious lesions  HEENT: atraumatic, normocephalic  NECK: supple,no adenopathy  LUNGS:

## 2021-02-15 ENCOUNTER — OFFICE VISIT (OUTPATIENT)
Dept: FAMILY MEDICINE CLINIC | Facility: CLINIC | Age: 36
End: 2021-02-15
Payer: COMMERCIAL

## 2021-02-15 VITALS
TEMPERATURE: 98 F | SYSTOLIC BLOOD PRESSURE: 116 MMHG | HEIGHT: 63 IN | DIASTOLIC BLOOD PRESSURE: 76 MMHG | OXYGEN SATURATION: 98 % | WEIGHT: 230 LBS | RESPIRATION RATE: 16 BRPM | BODY MASS INDEX: 40.75 KG/M2 | HEART RATE: 82 BPM

## 2021-02-15 DIAGNOSIS — J01.40 ACUTE NON-RECURRENT PANSINUSITIS: Primary | ICD-10-CM

## 2021-02-15 DIAGNOSIS — Z20.822 ENCOUNTER FOR SCREENING LABORATORY TESTING FOR COVID-19 VIRUS: ICD-10-CM

## 2021-02-15 PROCEDURE — 3008F BODY MASS INDEX DOCD: CPT | Performed by: PHYSICIAN ASSISTANT

## 2021-02-15 PROCEDURE — 99213 OFFICE O/P EST LOW 20 MIN: CPT | Performed by: PHYSICIAN ASSISTANT

## 2021-02-15 PROCEDURE — 3074F SYST BP LT 130 MM HG: CPT | Performed by: PHYSICIAN ASSISTANT

## 2021-02-15 PROCEDURE — 3078F DIAST BP <80 MM HG: CPT | Performed by: PHYSICIAN ASSISTANT

## 2021-02-15 RX ORDER — AMOXICILLIN AND CLAVULANATE POTASSIUM 875; 125 MG/1; MG/1
1 TABLET, FILM COATED ORAL 2 TIMES DAILY
Qty: 10 TABLET | Refills: 0 | Status: SHIPPED | OUTPATIENT
Start: 2021-02-15 | End: 2021-02-20

## 2021-02-15 NOTE — PATIENT INSTRUCTIONS
[facility]    Sinusitis (Antibiotic Treatment)    The sinuses are air-filled spaces within the bones of the face.  They connect to the inside of the nose. Sinusitis is an inflammation of the tissue that lines also ask the pharmacist for help. (People with high blood pressure should not use decongestants. They can raise blood pressure.) Talk with your provider or pharmacist if you have any questions about the medicine. .  · OTC antihistamines may help if allergie [id number] The [facility] 4037. All rights reserved. This information is not intended as a substitute for professional medical care. Always follow your healthcare professional's instructions.

## 2021-02-15 NOTE — PROGRESS NOTES
CHIEF COMPLAINT:   Patient presents with:  Sinus Problem    HPI:   Tapan Boston is a 38year old female who presents for sinus congestion for 1 month. Symptoms were improving but never completely resolved and worsened in the past 2-3 days.  Sinus congestio diverticulitis   • LAPAROSCOPIC CHOLECYSTECTOMY  06/07/2018   • LAPAROSCOPIC CHOLECYSTECTOMY N/A 6/7/2018    Performed by Duke Emerson MD at [address] [place] [address] MAIN OR      Family History   Problem Relation Age of Onset   • Diabetes Maternal Grandfather    • Diabetes Pater presents with    ASSESSMENT: Acute non-recurrent pansinusitis  (primary encounter diagnosis)  Encounter for screening laboratory testing for covid-19 virus    PLAN: will send out covid testing. To quarantine until results finalize.    Meds and instructions rub at night may also help soothe symptoms.   · An expectorant with guaifenesin may help thin nasal mucus and help your sinuses drain fluids.  Talk with your provider or pharmacists before taking an over-the-counter (OTC) medicine if you have any questions · Unusual drowsiness or confusion  · Swelling of your forehead or eyelids  · Symptoms don't go away in 10 days  · Vision problems, such as blurred or double vision  · Fever of 100.4ºF (38ºC) or higher, or as directed by your healthcare provider  Call [phone]

## 2021-02-16 LAB — SARS-COV-2 RNA RESP QL NAA+PROBE: DETECTED

## 2021-03-12 DIAGNOSIS — Z51.81 THERAPEUTIC DRUG MONITORING: ICD-10-CM

## 2021-03-12 DIAGNOSIS — E66.01 MORBID OBESITY (HCC): ICD-10-CM

## 2021-03-12 NOTE — TELEPHONE ENCOUNTER
Patient is scheduled for an upcoming weight loss appointment on Tuesday, 4/27 as she needs a 7 am appointment and no appointment that early until that Tuesday. Saturday appointment is already taken in April that she said she needed.     Patient also going

## 2021-03-13 RX ORDER — PHENTERMINE HYDROCHLORIDE 30 MG/1
30 CAPSULE ORAL EVERY MORNING
Qty: 30 CAPSULE | Refills: 0 | Status: SHIPPED | OUTPATIENT
Start: 2021-03-13 | End: 2021-04-12

## 2021-04-26 ENCOUNTER — TELEPHONE (OUTPATIENT)
Dept: OBGYN CLINIC | Facility: CLINIC | Age: 36
End: 2021-04-26

## 2021-04-26 NOTE — TELEPHONE ENCOUNTER
Refill request received via fax from [facility] for pts Priya OCP.  Form faxed back denied with instructions for pt to call office to schedule annual exam.

## 2021-04-27 ENCOUNTER — OFFICE VISIT (OUTPATIENT)
Dept: INTERNAL MEDICINE CLINIC | Facility: CLINIC | Age: 36
End: 2021-04-27
Payer: COMMERCIAL

## 2021-04-27 VITALS
SYSTOLIC BLOOD PRESSURE: 106 MMHG | BODY MASS INDEX: 40.36 KG/M2 | WEIGHT: 227.81 LBS | HEART RATE: 92 BPM | HEIGHT: 63 IN | DIASTOLIC BLOOD PRESSURE: 82 MMHG | OXYGEN SATURATION: 99 %

## 2021-04-27 DIAGNOSIS — R76.8 POSITIVE ANA (ANTINUCLEAR ANTIBODY): ICD-10-CM

## 2021-04-27 DIAGNOSIS — L66.9 SCARRING ALOPECIA: ICD-10-CM

## 2021-04-27 DIAGNOSIS — E66.01 MORBID OBESITY (HCC): ICD-10-CM

## 2021-04-27 DIAGNOSIS — Z00.00 ANNUAL PHYSICAL EXAM: Primary | ICD-10-CM

## 2021-04-27 DIAGNOSIS — Z51.81 THERAPEUTIC DRUG MONITORING: ICD-10-CM

## 2021-04-27 PROCEDURE — 83036 HEMOGLOBIN GLYCOSYLATED A1C: CPT | Performed by: INTERNAL MEDICINE

## 2021-04-27 PROCEDURE — 3008F BODY MASS INDEX DOCD: CPT | Performed by: INTERNAL MEDICINE

## 2021-04-27 PROCEDURE — 80061 LIPID PANEL: CPT | Performed by: INTERNAL MEDICINE

## 2021-04-27 PROCEDURE — 3074F SYST BP LT 130 MM HG: CPT | Performed by: INTERNAL MEDICINE

## 2021-04-27 PROCEDURE — 3079F DIAST BP 80-89 MM HG: CPT | Performed by: INTERNAL MEDICINE

## 2021-04-27 PROCEDURE — 99395 PREV VISIT EST AGE 18-39: CPT | Performed by: INTERNAL MEDICINE

## 2021-04-27 PROCEDURE — 80053 COMPREHEN METABOLIC PANEL: CPT | Performed by: INTERNAL MEDICINE

## 2021-04-27 RX ORDER — PHENTERMINE HYDROCHLORIDE 37.5 MG/1
37.5 TABLET ORAL
Qty: 30 TABLET | Refills: 0 | Status: SHIPPED | OUTPATIENT
Start: 2021-04-27

## 2021-04-27 RX ORDER — AMOXICILLIN AND CLAVULANATE POTASSIUM 875; 125 MG/1; MG/1
1 TABLET, FILM COATED ORAL 2 TIMES DAILY
COMMUNITY
Start: 2021-03-30 | End: 2021-07-24

## 2021-04-27 RX ORDER — NORGESTIMATE AND ETHINYL ESTRADIOL 0.25-0.035
1 KIT ORAL DAILY
Qty: 84 TABLET | Refills: 3 | Status: SHIPPED | OUTPATIENT
Start: 2021-04-27 | End: 2022-01-11

## 2021-04-27 NOTE — PROGRESS NOTES
Gladys Bates is a 39year old female.     Chief complaint: annual physical exam     HPI:     Gladys Bates is a 39year old pleasant female who presents for annual physical exam     No chest pain no sob no abdominal pain  No diarrhea or constipation   No f Paternal Grandmother    • Heart Disorder Paternal Grandfather    • Hypertension Mother    • Heart Disorder Maternal Grandmother    • Cancer Other    • Other (jordan casanova recently) Sister      Patient Active Problem List:     Positive BRENDAN (antinuclear a breakfast.  Dispense: 30 tablet; Refill: 0  - COMP METABOLIC PANEL (14)  - LIPID PANEL  - HEMOGLOBIN A1C    2.  Morbid obesity ([address]  Advised weight loss   Goal weight loss is 11 lbs in 3 months   Continue phentermine   Increase to 37.5     - Amoxicillin-Po every morning before breakfast.  Dispense: 30 tablet; Refill: 0  - COMP METABOLIC PANEL (14)  - LIPID PANEL  - HEMOGLOBIN A1C    5. Scarring alopecia  Follow up with derm     - Amoxicillin-Pot Clavulanate 875-125 MG Oral Tab;  Take 1 tablet by mouth 2 (two)

## 2021-04-27 NOTE — PATIENT INSTRUCTIONS
Healthy Tips for Eating Out  Choices  It’s not easy to change the habits of a lifetime. Experts say that it can take 6 months just to change one old habit for a healthier one. That’s why gradual change is so important.  These tips are reminders of the dozen the skin from fried chicken and choose mashed potatoes, corn on the cob, and baked beans on the side. · Order a broiled chicken salad and pile on fresh vegetables from the salad bar. Use a small amount of low-fat dressing.   · Top a baked potato with cotta foods made with butter, coconut or palm oil, or hydrogenated fats    © 6092-9469 The [facility] [place]. All rights reserved. This information is not intended as a substitute for professional medical care.  Oriana Owens

## 2021-06-01 ENCOUNTER — TELEPHONE (OUTPATIENT)
Dept: INTERNAL MEDICINE CLINIC | Facility: CLINIC | Age: 36
End: 2021-06-01

## 2021-06-01 ENCOUNTER — OFFICE VISIT (OUTPATIENT)
Dept: INTERNAL MEDICINE CLINIC | Facility: CLINIC | Age: 36
End: 2021-06-01
Payer: COMMERCIAL

## 2021-06-01 VITALS
BODY MASS INDEX: 40.46 KG/M2 | DIASTOLIC BLOOD PRESSURE: 80 MMHG | SYSTOLIC BLOOD PRESSURE: 124 MMHG | WEIGHT: 228.38 LBS | HEIGHT: 63 IN | HEART RATE: 83 BPM | OXYGEN SATURATION: 99 %

## 2021-06-01 DIAGNOSIS — Z51.81 THERAPEUTIC DRUG MONITORING: Primary | ICD-10-CM

## 2021-06-01 DIAGNOSIS — Z90.49 S/P LAPAROSCOPIC CHOLECYSTECTOMY: ICD-10-CM

## 2021-06-01 DIAGNOSIS — R10.11 RUQ PAIN: ICD-10-CM

## 2021-06-01 DIAGNOSIS — E66.01 MORBID OBESITY (HCC): ICD-10-CM

## 2021-06-01 PROCEDURE — 3074F SYST BP LT 130 MM HG: CPT | Performed by: INTERNAL MEDICINE

## 2021-06-01 PROCEDURE — 3079F DIAST BP 80-89 MM HG: CPT | Performed by: INTERNAL MEDICINE

## 2021-06-01 PROCEDURE — 3008F BODY MASS INDEX DOCD: CPT | Performed by: INTERNAL MEDICINE

## 2021-06-01 PROCEDURE — 99214 OFFICE O/P EST MOD 30 MIN: CPT | Performed by: INTERNAL MEDICINE

## 2021-06-01 RX ORDER — PEN NEEDLE, DIABETIC 30 GX3/16"
1 NEEDLE, DISPOSABLE MISCELLANEOUS DAILY
Qty: 90 EACH | Refills: 0 | Status: SHIPPED | OUTPATIENT
Start: 2021-06-01 | End: 2021-08-30

## 2021-06-01 RX ORDER — LIRAGLUTIDE 6 MG/ML
INJECTION, SOLUTION SUBCUTANEOUS
Qty: 9 ML | Refills: 0 | Status: SHIPPED | OUTPATIENT
Start: 2021-06-01 | End: 2021-07-24

## 2021-06-01 NOTE — PROGRESS NOTES
Miguel Carter is a 39year old female.     Chief complaint:weight loss follow up , medication refill, therapeutic drug monitoring    HPI:   HERI here for follow up on weight loss   Wt Readings from Last 12 Encounters:  06/01/21 : 228 lb 6.4 oz (103.6 kg)  0 Oral Tab Take 1,000 Units by mouth daily.      • ketoconazole 2 % External Cream APPLY TO AFFECTED AREA EVERY DAY AT NIGHT     • Norgestimate-Eth Estradiol 0.25-35 MG-MCG Oral Tab Take one tablet po daily     • VITAMIN B-12 1000 MCG Sublingual SL Tab Place and negatives noted in the the HPI          PHYSICAL EXAM:   /80   Pulse 83   Ht 5' 3\" (1.6 m)   Wt 228 lb 6.4 oz (103.6 kg)   LMP 05/26/2021 (Approximate)   SpO2 99%   BMI 40.46 kg/m²   GENERAL: well developed, well nourished,in no apparent distres is 1 hour 3 times a week  · Goal weight loss is 11 pounds in the next 3-month  · Stop phentermine since she has been on it for 6-month  · We will start Saxenda, discussed black box warning, educated the patient about using the Saxenda pen with demo pen  ·

## 2021-06-01 NOTE — PATIENT INSTRUCTIONS
Saxenda Pen Injector 6 mg/mL  Uses  This medicine is used for the following purposes:  · diabetes  · weight loss  Instructions  This medicine is used by injecting it into the skin.  Please ask your doctor, nurse or pharmacist for the correct places on you you take. Include both prescription and over-the-counter medicines. Also tell them about any vitamins, herbal medicines, or anything else you take for your health. This medicine may cause low blood sugar.  It is very important to have regular meals and exe any of your other medicines. Be sure to tell them about all the medicines you take. Carry glucose tablets or hard candy with you in case you experience low blood sugar from this medicine.   Please tell all your doctors and dentists that you are on this med questions, please ask your pharmacist.   © 2021 [address]

## 2021-06-01 NOTE — TELEPHONE ENCOUNTER
Pt says that even though her insurance has approved the saxenda it's still going to be $1000, what can she do? Is there another pharmacy where it would be cheaper?

## 2021-06-02 RX ORDER — NALTREXONE HYDROCHLORIDE AND BUPROPION HYDROCHLORIDE 8; 90 MG/1; MG/1
TABLET, EXTENDED RELEASE ORAL
Qty: 120 TABLET | Refills: 0 | Status: SHIPPED | OUTPATIENT
Start: 2021-06-02 | End: 2021-07-02

## 2021-06-02 NOTE — TELEPHONE ENCOUNTER
Discussed with the patient  toy is 7 advised to start Contrave  Discussed side effects  Sent to [facility]

## 2021-07-24 ENCOUNTER — OFFICE VISIT (OUTPATIENT)
Dept: INTERNAL MEDICINE CLINIC | Facility: CLINIC | Age: 36
End: 2021-07-24
Payer: COMMERCIAL

## 2021-07-24 VITALS
HEART RATE: 81 BPM | HEIGHT: 63 IN | OXYGEN SATURATION: 97 % | DIASTOLIC BLOOD PRESSURE: 80 MMHG | WEIGHT: 220.81 LBS | BODY MASS INDEX: 39.12 KG/M2 | SYSTOLIC BLOOD PRESSURE: 120 MMHG

## 2021-07-24 DIAGNOSIS — Z51.81 THERAPEUTIC DRUG MONITORING: Primary | ICD-10-CM

## 2021-07-24 DIAGNOSIS — E66.01 MORBID OBESITY (HCC): ICD-10-CM

## 2021-07-24 PROCEDURE — 99213 OFFICE O/P EST LOW 20 MIN: CPT | Performed by: INTERNAL MEDICINE

## 2021-07-24 PROCEDURE — 3074F SYST BP LT 130 MM HG: CPT | Performed by: INTERNAL MEDICINE

## 2021-07-24 PROCEDURE — 3008F BODY MASS INDEX DOCD: CPT | Performed by: INTERNAL MEDICINE

## 2021-07-24 PROCEDURE — 3079F DIAST BP 80-89 MM HG: CPT | Performed by: INTERNAL MEDICINE

## 2021-07-24 RX ORDER — MINOCYCLINE HYDROCHLORIDE 100 MG/1
CAPSULE ORAL
COMMUNITY
Start: 2021-07-21

## 2021-07-24 RX ORDER — NALTREXONE HYDROCHLORIDE AND BUPROPION HYDROCHLORIDE 8; 90 MG/1; MG/1
2 TABLET, EXTENDED RELEASE ORAL 2 TIMES DAILY
Qty: 120 TABLET | Refills: 0 | Status: SHIPPED | OUTPATIENT
Start: 2021-07-24 | End: 2021-08-23

## 2021-07-24 NOTE — PROGRESS NOTES
Barbara Mena is a 39year old female.     Chief complaint:weight loss follow up , medication refill, therapeutic drug monitoring    HPI:   HERI here for follow up on weight loss   Wt Readings from Last 12 Encounters:  07/24/21 : 220 lb 12.8 oz (100.2 kg) APPLY TO AFFECTED AREA EVERY DAY AT NIGHT     • Norgestimate-Eth Estradiol 0.25-35 MG-MCG Oral Tab Take one tablet po daily     • VITAMIN B-12 1000 MCG Sublingual SL Tab Place 1 tablet under the tongue daily.  90 tablet 0   • Phentermine HCl 37.5 MG Oral Ta SYSTEMS:   A comprehensive 10 point review of systems was completed.   Pertinent positives and negatives noted in the the HPI          PHYSICAL EXAM:   /80 (BP Location: Right arm, Patient Position: Sitting, Cuff Size: adult)   Pulse 81   Ht 5' 3\" (1 Reviewed:  1. Nutrition and the importance of regular protein intake  2. Labs ordered:  no  3. Importance of physical activity and reducing sedentary time  4.  Treatment plan         Please return to the clinic if you are having persistent or worsenin

## 2021-07-28 ENCOUNTER — TELEPHONE (OUTPATIENT)
Dept: INTERNAL MEDICINE CLINIC | Facility: CLINIC | Age: 36
End: 2021-07-28

## 2021-07-28 NOTE — TELEPHONE ENCOUNTER
Patient would like a return call from nurse. Patient said she's feeling dizzy. Vertigo symptom after taking Contrave gradually. Took a Dramaine and helped a little this morning with the dizziness.     Started on Sunday, and seems to long to be extended in

## 2021-07-29 NOTE — TELEPHONE ENCOUNTER
TOLD HER TO HOLD OFF ON THE CONTRAVE. STATES SHE HASN'T TAKEN IT NOW FOR 24 HOURS. FEELING A LITTLE BETTER. INFORMED HER THAT IF SHE CONTINUES TO BE DIZZY TO GO TO [facility] TO BE CHECKED OUT.   ALSO:  - MAKE SURE DRINKING ENOUGH WATER.  - IF DIZZINESS CONTINU

## 2021-11-23 NOTE — PATIENT INSTRUCTIONS
How to Stop Emotional Eating and Overeating   You have to know how to stop emotional eating and stop overeating if you want to lose weight and keep it off successfully. Emotional eating and overeating can’t really satisfy an insatiable appetite anyway.   A a list of healthy activities you enjoy. And, whenever you feel the need, treat yourself to something on your list.  6. Start eating healthier.  When you eat for health you’ll choose more high fiber foods, such as vegetables, beans, whole grains and fresh fr normal/soft/nontender/no distention/bowel sounds normal/no rebound tenderness/no guarding/no rigidity

## 2022-01-11 DIAGNOSIS — Z51.81 THERAPEUTIC DRUG MONITORING: ICD-10-CM

## 2022-01-11 DIAGNOSIS — R76.8 POSITIVE ANA (ANTINUCLEAR ANTIBODY): ICD-10-CM

## 2022-01-11 DIAGNOSIS — E66.01 MORBID OBESITY (HCC): ICD-10-CM

## 2022-01-11 DIAGNOSIS — L66.9 SCARRING ALOPECIA: ICD-10-CM

## 2022-01-11 DIAGNOSIS — Z00.00 ANNUAL PHYSICAL EXAM: ICD-10-CM

## 2022-01-11 RX ORDER — NORGESTIMATE AND ETHINYL ESTRADIOL 0.25-0.035
1 KIT ORAL DAILY
Qty: 84 TABLET | Refills: 3 | Status: SHIPPED | OUTPATIENT
Start: 2022-01-11

## 2022-01-11 NOTE — TELEPHONE ENCOUNTER
Requested Prescriptions     Pending Prescriptions Disp Refills   • Norgestimate-Eth Estradiol (MONO-LINYAH) 0.25-35 MG-MCG Oral Tab 84 tablet 3     Sig: Take 1 tablet by mouth daily.      Last office visit: 7-24-21  Medication last refilled: 4-27-21

## 2022-03-22 ENCOUNTER — APPOINTMENT (OUTPATIENT)
Dept: URBAN - METROPOLITAN AREA CLINIC 244 | Age: 37
Setting detail: DERMATOLOGY
End: 2022-03-23

## 2022-03-22 DIAGNOSIS — L738 OTHER SPECIFIED DISEASES OF HAIR AND HAIR FOLLICLES: ICD-10-CM

## 2022-03-22 DIAGNOSIS — L663 OTHER SPECIFIED DISEASES OF HAIR AND HAIR FOLLICLES: ICD-10-CM

## 2022-03-22 DIAGNOSIS — D22 MELANOCYTIC NEVI: ICD-10-CM

## 2022-03-22 PROBLEM — D22.5 MELANOCYTIC NEVI OF TRUNK: Status: ACTIVE | Noted: 2022-03-22

## 2022-03-22 PROBLEM — L02.821 FURUNCLE OF HEAD [ANY PART, EXCEPT FACE]: Status: ACTIVE | Noted: 2022-03-22

## 2022-03-22 PROCEDURE — OTHER COUNSELING: OTHER

## 2022-03-22 PROCEDURE — OTHER PHOTO-DOCUMENTATION: OTHER

## 2022-03-22 PROCEDURE — OTHER PRESCRIPTION: OTHER

## 2022-03-22 PROCEDURE — 99214 OFFICE O/P EST MOD 30 MIN: CPT

## 2022-03-22 RX ORDER — FLUOCINONIDE 0.5 MG/ML
SOLUTION TOPICAL
Qty: 60 | Refills: 6 | Status: ERX | COMMUNITY
Start: 2022-03-22

## 2022-03-22 RX ORDER — MINOCYCLINE HYDROCHLORIDE 100 MG/1
CAPSULE ORAL
Qty: 60 | Refills: 6 | Status: ERX | COMMUNITY
Start: 2022-03-22

## 2022-03-22 ASSESSMENT — LOCATION DETAILED DESCRIPTION DERM
LOCATION DETAILED: MID-OCCIPITAL SCALP
LOCATION DETAILED: LEFT MEDIAL BREAST 7-8:00 REGION

## 2022-03-22 ASSESSMENT — LOCATION SIMPLE DESCRIPTION DERM
LOCATION SIMPLE: LEFT BREAST
LOCATION SIMPLE: POSTERIOR SCALP

## 2022-03-22 ASSESSMENT — LOCATION ZONE DERM
LOCATION ZONE: TRUNK
LOCATION ZONE: SCALP

## 2022-04-12 ENCOUNTER — APPOINTMENT (OUTPATIENT)
Dept: URBAN - METROPOLITAN AREA CLINIC 244 | Age: 37
Setting detail: DERMATOLOGY
End: 2022-04-12

## 2022-04-12 DIAGNOSIS — L663 OTHER SPECIFIED DISEASES OF HAIR AND HAIR FOLLICLES: ICD-10-CM

## 2022-04-12 DIAGNOSIS — L738 OTHER SPECIFIED DISEASES OF HAIR AND HAIR FOLLICLES: ICD-10-CM

## 2022-04-12 PROBLEM — L02.821 FURUNCLE OF HEAD [ANY PART, EXCEPT FACE]: Status: ACTIVE | Noted: 2022-04-12

## 2022-04-12 PROCEDURE — OTHER PRESCRIPTION MEDICATION MANAGEMENT: OTHER

## 2022-04-12 PROCEDURE — 99214 OFFICE O/P EST MOD 30 MIN: CPT

## 2022-04-12 PROCEDURE — OTHER COUNSELING: OTHER

## 2022-04-12 ASSESSMENT — LOCATION DETAILED DESCRIPTION DERM: LOCATION DETAILED: MID-OCCIPITAL SCALP

## 2022-04-12 ASSESSMENT — LOCATION SIMPLE DESCRIPTION DERM: LOCATION SIMPLE: POSTERIOR SCALP

## 2022-04-12 ASSESSMENT — LOCATION ZONE DERM: LOCATION ZONE: SCALP

## 2022-04-12 NOTE — PROCEDURE: PRESCRIPTION MEDICATION MANAGEMENT
Continue Regimen: Minocycline and Fluocinonide solution
Detail Level: Zone
Render In Strict Bullet Format?: No

## 2022-05-17 ENCOUNTER — APPOINTMENT (OUTPATIENT)
Dept: URBAN - METROPOLITAN AREA CLINIC 244 | Age: 37
Setting detail: DERMATOLOGY
End: 2022-05-18

## 2022-05-17 DIAGNOSIS — L663 OTHER SPECIFIED DISEASES OF HAIR AND HAIR FOLLICLES: ICD-10-CM

## 2022-05-17 DIAGNOSIS — R21 RASH AND OTHER NONSPECIFIC SKIN ERUPTION: ICD-10-CM

## 2022-05-17 DIAGNOSIS — L20.89 OTHER ATOPIC DERMATITIS: ICD-10-CM

## 2022-05-17 DIAGNOSIS — L738 OTHER SPECIFIED DISEASES OF HAIR AND HAIR FOLLICLES: ICD-10-CM

## 2022-05-17 PROBLEM — L20.84 INTRINSIC (ALLERGIC) ECZEMA: Status: ACTIVE | Noted: 2022-05-17

## 2022-05-17 PROBLEM — L02.821 FURUNCLE OF HEAD [ANY PART, EXCEPT FACE]: Status: ACTIVE | Noted: 2022-05-17

## 2022-05-17 PROCEDURE — OTHER PRESCRIPTION: OTHER

## 2022-05-17 PROCEDURE — OTHER ADDITIONAL NOTES: OTHER

## 2022-05-17 PROCEDURE — 99214 OFFICE O/P EST MOD 30 MIN: CPT

## 2022-05-17 PROCEDURE — OTHER COUNSELING: OTHER

## 2022-05-17 PROCEDURE — OTHER PRESCRIPTION MEDICATION MANAGEMENT: OTHER

## 2022-05-17 RX ORDER — TRIAMCINOLONE ACETONIDE 1 MG/G
OINTMENT TOPICAL
Qty: 15 | Refills: 1 | Status: ERX | COMMUNITY
Start: 2022-05-17

## 2022-05-17 ASSESSMENT — LOCATION DETAILED DESCRIPTION DERM
LOCATION DETAILED: RIGHT MEDIAL FRONTAL SCALP
LOCATION DETAILED: RIGHT INFERIOR POSTERIOR NECK
LOCATION DETAILED: MID-OCCIPITAL SCALP
LOCATION DETAILED: LEFT MEDIAL FRONTAL SCALP
LOCATION DETAILED: RIGHT SUPERIOR PARIETAL SCALP

## 2022-05-17 ASSESSMENT — LOCATION SIMPLE DESCRIPTION DERM
LOCATION SIMPLE: POSTERIOR SCALP
LOCATION SIMPLE: LEFT SCALP
LOCATION SIMPLE: SCALP
LOCATION SIMPLE: RIGHT SCALP
LOCATION SIMPLE: POSTERIOR NECK

## 2022-05-17 ASSESSMENT — LOCATION ZONE DERM
LOCATION ZONE: SCALP
LOCATION ZONE: NECK

## 2022-05-17 NOTE — PROCEDURE: PRESCRIPTION MEDICATION MANAGEMENT
Continue Regimen: Minocycline if flares and Fluocinonide solution BID
Render In Strict Bullet Format?: No
Detail Level: Zone

## 2022-08-31 ENCOUNTER — APPOINTMENT (OUTPATIENT)
Dept: URBAN - METROPOLITAN AREA CLINIC 244 | Age: 37
Setting detail: DERMATOLOGY
End: 2022-09-01

## 2022-08-31 DIAGNOSIS — L663 OTHER SPECIFIED DISEASES OF HAIR AND HAIR FOLLICLES: ICD-10-CM

## 2022-08-31 DIAGNOSIS — L738 OTHER SPECIFIED DISEASES OF HAIR AND HAIR FOLLICLES: ICD-10-CM

## 2022-08-31 PROBLEM — L02.821 FURUNCLE OF HEAD [ANY PART, EXCEPT FACE]: Status: ACTIVE | Noted: 2022-08-31

## 2022-08-31 PROCEDURE — OTHER ADDITIONAL NOTES: OTHER

## 2022-08-31 PROCEDURE — OTHER COUNSELING: OTHER

## 2022-08-31 PROCEDURE — OTHER PRESCRIPTION MEDICATION MANAGEMENT: OTHER

## 2022-08-31 PROCEDURE — 99214 OFFICE O/P EST MOD 30 MIN: CPT

## 2022-08-31 ASSESSMENT — LOCATION ZONE DERM: LOCATION ZONE: SCALP

## 2022-08-31 ASSESSMENT — LOCATION DETAILED DESCRIPTION DERM: LOCATION DETAILED: MID-OCCIPITAL SCALP

## 2022-08-31 ASSESSMENT — LOCATION SIMPLE DESCRIPTION DERM: LOCATION SIMPLE: POSTERIOR SCALP

## 2022-08-31 NOTE — PROCEDURE: ADDITIONAL NOTES
Render Risk Assessment In Note?: no
Detail Level: Detailed
Additional Notes: some signs of excoriation, pt in distress due to business

## 2022-08-31 NOTE — PROCEDURE: PRESCRIPTION MEDICATION MANAGEMENT
Plan: Recc to avoid picking/scratching area
Continue Regimen: Minocycline if flares and Fluocinonide solution BID
Detail Level: Zone
Render In Strict Bullet Format?: No

## 2023-01-05 ENCOUNTER — OFFICE VISIT (OUTPATIENT)
Dept: INTERNAL MEDICINE CLINIC | Facility: CLINIC | Age: 38
End: 2023-01-05
Payer: COMMERCIAL

## 2023-01-05 VITALS
OXYGEN SATURATION: 99 % | HEIGHT: 63 IN | SYSTOLIC BLOOD PRESSURE: 136 MMHG | BODY MASS INDEX: 48.09 KG/M2 | DIASTOLIC BLOOD PRESSURE: 82 MMHG | HEART RATE: 80 BPM | WEIGHT: 271.38 LBS

## 2023-01-05 DIAGNOSIS — Z00.00 ANNUAL PHYSICAL EXAM: Primary | ICD-10-CM

## 2023-01-05 DIAGNOSIS — Z12.4 SCREENING FOR CERVICAL CANCER: ICD-10-CM

## 2023-01-05 DIAGNOSIS — E66.01 MORBID OBESITY (HCC): ICD-10-CM

## 2023-01-05 DIAGNOSIS — L66.9 SCARRING ALOPECIA: ICD-10-CM

## 2023-01-05 DIAGNOSIS — Z80.8 FAMILY HISTORY OF SKIN CANCER: ICD-10-CM

## 2023-01-05 DIAGNOSIS — Z51.81 THERAPEUTIC DRUG MONITORING: ICD-10-CM

## 2023-01-05 DIAGNOSIS — R76.8 POSITIVE ANA (ANTINUCLEAR ANTIBODY): ICD-10-CM

## 2023-01-05 LAB
ALBUMIN SERPL-MCNC: 3.9 G/DL (ref 3.4–5)
ALBUMIN/GLOB SERPL: 1 {RATIO} (ref 1–2)
ALP LIVER SERPL-CCNC: 86 U/L
ALT SERPL-CCNC: 36 U/L
ANION GAP SERPL CALC-SCNC: 7 MMOL/L (ref 0–18)
AST SERPL-CCNC: 19 U/L (ref 15–37)
BASOPHILS # BLD AUTO: 0.05 X10(3) UL (ref 0–0.2)
BASOPHILS NFR BLD AUTO: 0.8 %
BILIRUB SERPL-MCNC: 0.4 MG/DL (ref 0.1–2)
BUN BLD-MCNC: 14 MG/DL (ref 7–18)
BUN/CREAT SERPL: 17.7 (ref 10–20)
CALCIUM BLD-MCNC: 9.2 MG/DL (ref 8.5–10.1)
CHLORIDE SERPL-SCNC: 106 MMOL/L (ref 98–112)
CHOLEST SERPL-MCNC: 188 MG/DL (ref ?–200)
CO2 SERPL-SCNC: 26 MMOL/L (ref 21–32)
CREAT BLD-MCNC: 0.79 MG/DL
DEPRECATED RDW RBC AUTO: 45.7 FL (ref 35.1–46.3)
EOSINOPHIL # BLD AUTO: 0.08 X10(3) UL (ref 0–0.7)
EOSINOPHIL NFR BLD AUTO: 1.3 %
ERYTHROCYTE [DISTWIDTH] IN BLOOD BY AUTOMATED COUNT: 13.2 % (ref 11–15)
EST. AVERAGE GLUCOSE BLD GHB EST-MCNC: 100 MG/DL (ref 68–126)
FASTING PATIENT LIPID ANSWER: YES
FASTING STATUS PATIENT QL REPORTED: YES
GFR SERPLBLD BASED ON 1.73 SQ M-ARVRAT: 98 ML/MIN/1.73M2 (ref 60–?)
GLOBULIN PLAS-MCNC: 3.8 G/DL (ref 2.8–4.4)
GLUCOSE BLD-MCNC: 86 MG/DL (ref 70–99)
HBA1C MFR BLD: 5.1 % (ref ?–5.7)
HCT VFR BLD AUTO: 44.3 %
HDLC SERPL-MCNC: 90 MG/DL (ref 40–59)
HGB BLD-MCNC: 14.4 G/DL
IMM GRANULOCYTES # BLD AUTO: 0.02 X10(3) UL (ref 0–1)
IMM GRANULOCYTES NFR BLD: 0.3 %
LDLC SERPL CALC-MCNC: 85 MG/DL (ref ?–100)
LYMPHOCYTES # BLD AUTO: 1.8 X10(3) UL (ref 1–4)
LYMPHOCYTES NFR BLD AUTO: 28.6 %
MCH RBC QN AUTO: 30.7 PG (ref 26–34)
MCHC RBC AUTO-ENTMCNC: 32.5 G/DL (ref 31–37)
MCV RBC AUTO: 94.5 FL
MONOCYTES # BLD AUTO: 0.38 X10(3) UL (ref 0.1–1)
MONOCYTES NFR BLD AUTO: 6 %
NEUTROPHILS # BLD AUTO: 3.97 X10 (3) UL (ref 1.5–7.7)
NEUTROPHILS # BLD AUTO: 3.97 X10(3) UL (ref 1.5–7.7)
NEUTROPHILS NFR BLD AUTO: 63 %
NONHDLC SERPL-MCNC: 98 MG/DL (ref ?–130)
OSMOLALITY SERPL CALC.SUM OF ELEC: 288 MOSM/KG (ref 275–295)
PLATELET # BLD AUTO: 252 10(3)UL (ref 150–450)
POTASSIUM SERPL-SCNC: 3.9 MMOL/L (ref 3.5–5.1)
PROT SERPL-MCNC: 7.7 G/DL (ref 6.4–8.2)
RBC # BLD AUTO: 4.69 X10(6)UL
SODIUM SERPL-SCNC: 139 MMOL/L (ref 136–145)
TRIGL SERPL-MCNC: 69 MG/DL (ref 30–149)
TSI SER-ACNC: 2.32 MIU/ML (ref 0.36–3.74)
VIT D+METAB SERPL-MCNC: 12.1 NG/ML (ref 30–100)
VLDLC SERPL CALC-MCNC: 11 MG/DL (ref 0–30)
WBC # BLD AUTO: 6.3 X10(3) UL (ref 4–11)

## 2023-01-05 PROCEDURE — 82306 VITAMIN D 25 HYDROXY: CPT | Performed by: INTERNAL MEDICINE

## 2023-01-05 PROCEDURE — 80050 GENERAL HEALTH PANEL: CPT | Performed by: INTERNAL MEDICINE

## 2023-01-05 PROCEDURE — 90715 TDAP VACCINE 7 YRS/> IM: CPT | Performed by: INTERNAL MEDICINE

## 2023-01-05 PROCEDURE — 3075F SYST BP GE 130 - 139MM HG: CPT | Performed by: INTERNAL MEDICINE

## 2023-01-05 PROCEDURE — 80061 LIPID PANEL: CPT | Performed by: INTERNAL MEDICINE

## 2023-01-05 PROCEDURE — 83036 HEMOGLOBIN GLYCOSYLATED A1C: CPT | Performed by: INTERNAL MEDICINE

## 2023-01-05 PROCEDURE — 90471 IMMUNIZATION ADMIN: CPT | Performed by: INTERNAL MEDICINE

## 2023-01-05 PROCEDURE — 3079F DIAST BP 80-89 MM HG: CPT | Performed by: INTERNAL MEDICINE

## 2023-01-05 PROCEDURE — 99395 PREV VISIT EST AGE 18-39: CPT | Performed by: INTERNAL MEDICINE

## 2023-01-05 PROCEDURE — 3008F BODY MASS INDEX DOCD: CPT | Performed by: INTERNAL MEDICINE

## 2023-01-05 RX ORDER — PHENTERMINE HYDROCHLORIDE 37.5 MG/1
37.5 TABLET ORAL
Qty: 60 TABLET | Refills: 0 | Status: SHIPPED | OUTPATIENT
Start: 2023-01-05 | End: 2023-03-06

## 2023-01-05 RX ORDER — NORGESTIMATE AND ETHINYL ESTRADIOL 0.25-0.035
1 KIT ORAL DAILY
Qty: 84 TABLET | Refills: 3 | Status: SHIPPED | OUTPATIENT
Start: 2023-01-05

## 2023-01-05 RX ORDER — FLUOCINONIDE TOPICAL SOLUTION USP, 0.05% 0.5 MG/ML
1 SOLUTION TOPICAL 2 TIMES DAILY
COMMUNITY
Start: 2022-12-26

## 2023-03-28 ENCOUNTER — OFFICE VISIT (OUTPATIENT)
Dept: INTERNAL MEDICINE CLINIC | Facility: CLINIC | Age: 38
End: 2023-03-28
Payer: COMMERCIAL

## 2023-03-28 VITALS
HEART RATE: 74 BPM | BODY MASS INDEX: 46.78 KG/M2 | WEIGHT: 264 LBS | DIASTOLIC BLOOD PRESSURE: 86 MMHG | HEIGHT: 63 IN | OXYGEN SATURATION: 100 % | SYSTOLIC BLOOD PRESSURE: 108 MMHG

## 2023-03-28 DIAGNOSIS — E66.01 MORBID OBESITY (HCC): ICD-10-CM

## 2023-03-28 DIAGNOSIS — Z51.81 THERAPEUTIC DRUG MONITORING: Primary | ICD-10-CM

## 2023-03-28 PROCEDURE — 3008F BODY MASS INDEX DOCD: CPT | Performed by: INTERNAL MEDICINE

## 2023-03-28 PROCEDURE — 3074F SYST BP LT 130 MM HG: CPT | Performed by: INTERNAL MEDICINE

## 2023-03-28 PROCEDURE — 3079F DIAST BP 80-89 MM HG: CPT | Performed by: INTERNAL MEDICINE

## 2023-03-28 PROCEDURE — 99213 OFFICE O/P EST LOW 20 MIN: CPT | Performed by: INTERNAL MEDICINE

## 2023-03-28 RX ORDER — PHENTERMINE HYDROCHLORIDE 37.5 MG/1
37.5 TABLET ORAL
Qty: 60 TABLET | Refills: 0 | Status: SHIPPED | OUTPATIENT
Start: 2023-03-28 | End: 2023-05-27

## 2023-05-09 ENCOUNTER — OFFICE VISIT (OUTPATIENT)
Dept: INTERNAL MEDICINE CLINIC | Facility: CLINIC | Age: 38
End: 2023-05-09
Payer: COMMERCIAL

## 2023-05-09 VITALS
HEART RATE: 90 BPM | HEIGHT: 63 IN | BODY MASS INDEX: 44.94 KG/M2 | WEIGHT: 253.63 LBS | DIASTOLIC BLOOD PRESSURE: 86 MMHG | OXYGEN SATURATION: 100 % | SYSTOLIC BLOOD PRESSURE: 118 MMHG

## 2023-05-09 DIAGNOSIS — E66.01 MORBID OBESITY (HCC): ICD-10-CM

## 2023-05-09 DIAGNOSIS — Z51.81 THERAPEUTIC DRUG MONITORING: Primary | ICD-10-CM

## 2023-05-09 PROCEDURE — 99213 OFFICE O/P EST LOW 20 MIN: CPT | Performed by: INTERNAL MEDICINE

## 2023-05-09 PROCEDURE — 3008F BODY MASS INDEX DOCD: CPT | Performed by: INTERNAL MEDICINE

## 2023-05-09 PROCEDURE — 3074F SYST BP LT 130 MM HG: CPT | Performed by: INTERNAL MEDICINE

## 2023-05-09 PROCEDURE — 3079F DIAST BP 80-89 MM HG: CPT | Performed by: INTERNAL MEDICINE

## 2023-05-09 RX ORDER — PHENTERMINE HYDROCHLORIDE 37.5 MG/1
37.5 TABLET ORAL
Qty: 30 TABLET | Refills: 2 | Status: SHIPPED | OUTPATIENT
Start: 2023-05-09 | End: 2023-06-08

## 2023-05-09 RX ORDER — CIPROFLOXACIN 500 MG/1
1 TABLET, FILM COATED ORAL AS DIRECTED
COMMUNITY
Start: 2021-07-28

## 2023-07-31 ENCOUNTER — OFFICE VISIT (OUTPATIENT)
Dept: INTERNAL MEDICINE CLINIC | Facility: CLINIC | Age: 38
End: 2023-07-31
Payer: COMMERCIAL

## 2023-07-31 VITALS
WEIGHT: 253.38 LBS | BODY MASS INDEX: 44.89 KG/M2 | OXYGEN SATURATION: 98 % | HEART RATE: 72 BPM | DIASTOLIC BLOOD PRESSURE: 84 MMHG | HEIGHT: 63 IN | SYSTOLIC BLOOD PRESSURE: 120 MMHG

## 2023-07-31 DIAGNOSIS — Z51.81 THERAPEUTIC DRUG MONITORING: Primary | ICD-10-CM

## 2023-07-31 DIAGNOSIS — E66.01 MORBID OBESITY (HCC): ICD-10-CM

## 2023-07-31 PROCEDURE — 3008F BODY MASS INDEX DOCD: CPT | Performed by: INTERNAL MEDICINE

## 2023-07-31 PROCEDURE — 3074F SYST BP LT 130 MM HG: CPT | Performed by: INTERNAL MEDICINE

## 2023-07-31 PROCEDURE — 3079F DIAST BP 80-89 MM HG: CPT | Performed by: INTERNAL MEDICINE

## 2023-07-31 PROCEDURE — 99213 OFFICE O/P EST LOW 20 MIN: CPT | Performed by: INTERNAL MEDICINE

## 2023-07-31 RX ORDER — NORGESTIMATE AND ETHINYL ESTRADIOL 0.25-0.035
1 KIT ORAL DAILY
Qty: 84 TABLET | Refills: 3 | Status: SHIPPED | OUTPATIENT
Start: 2023-07-31

## 2023-07-31 RX ORDER — PHENTERMINE HYDROCHLORIDE 37.5 MG/1
37.5 TABLET ORAL
Qty: 30 TABLET | Refills: 2 | Status: SHIPPED | OUTPATIENT
Start: 2023-07-31 | End: 2023-10-29

## 2023-12-04 ENCOUNTER — OFFICE VISIT (OUTPATIENT)
Dept: INTERNAL MEDICINE CLINIC | Facility: CLINIC | Age: 38
End: 2023-12-04
Payer: COMMERCIAL

## 2023-12-04 VITALS
WEIGHT: 252.38 LBS | SYSTOLIC BLOOD PRESSURE: 112 MMHG | HEIGHT: 63 IN | BODY MASS INDEX: 44.72 KG/M2 | OXYGEN SATURATION: 99 % | DIASTOLIC BLOOD PRESSURE: 76 MMHG | HEART RATE: 82 BPM | TEMPERATURE: 97 F

## 2023-12-04 DIAGNOSIS — F43.9 STRESS: ICD-10-CM

## 2023-12-04 DIAGNOSIS — E66.01 MORBID OBESITY (HCC): ICD-10-CM

## 2023-12-04 DIAGNOSIS — F41.9 ANXIETY AND DEPRESSION: ICD-10-CM

## 2023-12-04 DIAGNOSIS — Z51.81 THERAPEUTIC DRUG MONITORING: Primary | ICD-10-CM

## 2023-12-04 DIAGNOSIS — F32.A ANXIETY AND DEPRESSION: ICD-10-CM

## 2023-12-04 PROCEDURE — 3074F SYST BP LT 130 MM HG: CPT | Performed by: INTERNAL MEDICINE

## 2023-12-04 PROCEDURE — 3008F BODY MASS INDEX DOCD: CPT | Performed by: INTERNAL MEDICINE

## 2023-12-04 PROCEDURE — 99215 OFFICE O/P EST HI 40 MIN: CPT | Performed by: INTERNAL MEDICINE

## 2023-12-04 PROCEDURE — 3078F DIAST BP <80 MM HG: CPT | Performed by: INTERNAL MEDICINE

## 2023-12-04 RX ORDER — PHENTERMINE HYDROCHLORIDE 37.5 MG/1
37.5 TABLET ORAL
Qty: 60 TABLET | Refills: 0 | Status: SHIPPED | OUTPATIENT
Start: 2023-12-04 | End: 2024-02-02

## 2023-12-04 RX ORDER — BUPROPION HYDROCHLORIDE 150 MG/1
150 TABLET ORAL 2 TIMES DAILY
Qty: 180 TABLET | Refills: 0 | Status: SHIPPED | OUTPATIENT
Start: 2023-12-04 | End: 2024-03-03

## 2024-01-25 NOTE — PROCEDURE: ADDITIONAL NOTES
[facility] CARDIOLOGY, [address], [place] [address]  PHONE: [phone]    Lubna Cabezas  1951      SUBJECTIVE:   Lubna Cabezas is a 72 y.o. female seen for a follow up visit regarding the following:     Chief Complaint   Patient presents with    Congestive Heart Failure       HPI:    Patient presents for follow-up.  Last seen in July.  Multiple issues addressed:    Chronic systolic heart failure:  Rare lasix.  No PND or orthopnea.  Dyspnea stable.  Recent echo shows:   Echo (1/10/24):  Low normal EF.  Normal diastolic function.  Mild/moderate MR.LAE.      Hypertension:  BP controlled    Edema:  Minimal    PVCs:  Occasional flutters with laying on left side.  No sustained tachycardia.    Obesity:  Unchanged.      Asthma:  Seen by Dr. Penny 1/3/24.  Note reviewed.  Medicaitons including Breo, albuterol and Singular continued.        Past Medical History, Past Surgical History, Family history, Social History, and Medications were all reviewed with the patient today and updated as necessary.           Current Outpatient Medications:     BREO ELLIPTA 100-25 MCG/ACT inhaler, Inhale 1 puff into the lungs daily, Disp: 1 each, Rfl: 11    cetirizine (ZYRTEC) 10 MG tablet, Take 1 tablet by mouth daily, Disp: 90 tablet, Rfl: 3    montelukast (SINGULAIR) 10 MG tablet, Take 1 tablet by mouth daily, Disp: 90 tablet, Rfl: 3    azelastine (ASTELIN) 0.1 % nasal spray, 1 spray by Nasal route 2 times daily Use in each nostril as directed, Disp: 60 mL, Rfl: 11    fluticasone (FLONASE) 50 MCG/ACT nasal spray, 1 spray by Each Nostril route daily, Disp: 32 g, Rfl: 11    magnesium oxide (MAG-OX) 400 (240 Mg) MG tablet, TAKE 1 (ONE) TABLET TWICE DAILY., Disp: 180 tablet, Rfl: 3    furosemide (LASIX) 40 MG tablet, Take one tablet daily as needed for fluid, Disp: 60 tablet, Rfl: 3    albuterol sulfate HFA (PROVENTIL;VENTOLIN;PROAIR) 108 (90 Base) MCG/ACT inhaler, Inhale 2 puffs every 6 (six) hours as needed for 
Render Risk Assessment In Note?: no
Additional Notes: Advice Pt not to touch it
Detail Level: Simple

## 2024-02-21 ENCOUNTER — OFFICE VISIT (OUTPATIENT)
Dept: INTERNAL MEDICINE CLINIC | Facility: CLINIC | Age: 39
End: 2024-02-21
Payer: COMMERCIAL

## 2024-02-21 VITALS
HEART RATE: 87 BPM | SYSTOLIC BLOOD PRESSURE: 110 MMHG | DIASTOLIC BLOOD PRESSURE: 70 MMHG | HEIGHT: 63 IN | OXYGEN SATURATION: 98 % | BODY MASS INDEX: 44.3 KG/M2 | WEIGHT: 250 LBS

## 2024-02-21 DIAGNOSIS — Z12.11 SCREENING FOR COLON CANCER: ICD-10-CM

## 2024-02-21 DIAGNOSIS — E66.01 MORBID OBESITY (HCC): ICD-10-CM

## 2024-02-21 DIAGNOSIS — Z12.31 ENCOUNTER FOR SCREENING MAMMOGRAM FOR MALIGNANT NEOPLASM OF BREAST: ICD-10-CM

## 2024-02-21 DIAGNOSIS — R76.8 POSITIVE ANA (ANTINUCLEAR ANTIBODY): ICD-10-CM

## 2024-02-21 DIAGNOSIS — Z00.00 ANNUAL PHYSICAL EXAM: Primary | ICD-10-CM

## 2024-02-21 LAB
ALBUMIN SERPL-MCNC: 4.6 G/DL (ref 3.2–4.8)
ALBUMIN/GLOB SERPL: 1.6 {RATIO} (ref 1–2)
ALP LIVER SERPL-CCNC: 65 U/L
ALT SERPL-CCNC: 33 U/L
ANION GAP SERPL CALC-SCNC: 8 MMOL/L (ref 0–18)
AST SERPL-CCNC: 27 U/L (ref ?–34)
BASOPHILS # BLD AUTO: 0.04 X10(3) UL (ref 0–0.2)
BASOPHILS NFR BLD AUTO: 0.6 %
BILIRUB SERPL-MCNC: 0.3 MG/DL (ref 0.3–1.2)
BUN BLD-MCNC: 11 MG/DL (ref 9–23)
BUN/CREAT SERPL: 14.5 (ref 10–20)
CALCIUM BLD-MCNC: 9.4 MG/DL (ref 8.7–10.4)
CHLORIDE SERPL-SCNC: 108 MMOL/L (ref 98–112)
CHOLEST SERPL-MCNC: 177 MG/DL (ref ?–200)
CO2 SERPL-SCNC: 25 MMOL/L (ref 21–32)
CREAT BLD-MCNC: 0.76 MG/DL
DEPRECATED RDW RBC AUTO: 45.6 FL (ref 35.1–46.3)
EGFRCR SERPLBLD CKD-EPI 2021: 102 ML/MIN/1.73M2 (ref 60–?)
EOSINOPHIL # BLD AUTO: 0.06 X10(3) UL (ref 0–0.7)
EOSINOPHIL NFR BLD AUTO: 0.9 %
ERYTHROCYTE [DISTWIDTH] IN BLOOD BY AUTOMATED COUNT: 13.2 % (ref 11–15)
EST. AVERAGE GLUCOSE BLD GHB EST-MCNC: 105 MG/DL (ref 68–126)
FASTING PATIENT LIPID ANSWER: YES
FASTING STATUS PATIENT QL REPORTED: YES
GLOBULIN PLAS-MCNC: 2.9 G/DL (ref 2.8–4.4)
GLUCOSE BLD-MCNC: 82 MG/DL (ref 70–99)
HBA1C MFR BLD: 5.3 % (ref ?–5.7)
HCT VFR BLD AUTO: 43.6 %
HDLC SERPL-MCNC: 72 MG/DL (ref 40–59)
HGB BLD-MCNC: 14.3 G/DL
IMM GRANULOCYTES # BLD AUTO: 0.01 X10(3) UL (ref 0–1)
IMM GRANULOCYTES NFR BLD: 0.2 %
LDLC SERPL CALC-MCNC: 87 MG/DL (ref ?–100)
LYMPHOCYTES # BLD AUTO: 1.88 X10(3) UL (ref 1–4)
LYMPHOCYTES NFR BLD AUTO: 29.5 %
MCH RBC QN AUTO: 30.7 PG (ref 26–34)
MCHC RBC AUTO-ENTMCNC: 32.8 G/DL (ref 31–37)
MCV RBC AUTO: 93.6 FL
MONOCYTES # BLD AUTO: 0.3 X10(3) UL (ref 0.1–1)
MONOCYTES NFR BLD AUTO: 4.7 %
NEUTROPHILS # BLD AUTO: 4.08 X10 (3) UL (ref 1.5–7.7)
NEUTROPHILS # BLD AUTO: 4.08 X10(3) UL (ref 1.5–7.7)
NEUTROPHILS NFR BLD AUTO: 64.1 %
NONHDLC SERPL-MCNC: 105 MG/DL (ref ?–130)
OSMOLALITY SERPL CALC.SUM OF ELEC: 290 MOSM/KG (ref 275–295)
PLATELET # BLD AUTO: 245 10(3)UL (ref 150–450)
POTASSIUM SERPL-SCNC: 4.2 MMOL/L (ref 3.5–5.1)
PROT SERPL-MCNC: 7.5 G/DL (ref 5.7–8.2)
RBC # BLD AUTO: 4.66 X10(6)UL
SODIUM SERPL-SCNC: 141 MMOL/L (ref 136–145)
TRIGL SERPL-MCNC: 100 MG/DL (ref 30–149)
TSI SER-ACNC: 3.28 MIU/ML (ref 0.55–4.78)
VIT D+METAB SERPL-MCNC: 9.8 NG/ML (ref 30–100)
VLDLC SERPL CALC-MCNC: 16 MG/DL (ref 0–30)
WBC # BLD AUTO: 6.4 X10(3) UL (ref 4–11)

## 2024-02-21 PROCEDURE — 3074F SYST BP LT 130 MM HG: CPT | Performed by: INTERNAL MEDICINE

## 2024-02-21 PROCEDURE — 82306 VITAMIN D 25 HYDROXY: CPT | Performed by: INTERNAL MEDICINE

## 2024-02-21 PROCEDURE — 83036 HEMOGLOBIN GLYCOSYLATED A1C: CPT | Performed by: INTERNAL MEDICINE

## 2024-02-21 PROCEDURE — 99395 PREV VISIT EST AGE 18-39: CPT | Performed by: INTERNAL MEDICINE

## 2024-02-21 PROCEDURE — 80050 GENERAL HEALTH PANEL: CPT | Performed by: INTERNAL MEDICINE

## 2024-02-21 PROCEDURE — 3008F BODY MASS INDEX DOCD: CPT | Performed by: INTERNAL MEDICINE

## 2024-02-21 PROCEDURE — 3078F DIAST BP <80 MM HG: CPT | Performed by: INTERNAL MEDICINE

## 2024-02-21 PROCEDURE — 80061 LIPID PANEL: CPT | Performed by: INTERNAL MEDICINE

## 2024-02-21 RX ORDER — BUPROPION HYDROCHLORIDE 300 MG/1
300 TABLET ORAL DAILY
Qty: 90 TABLET | Refills: 1 | Status: SHIPPED | OUTPATIENT
Start: 2024-02-21 | End: 2024-05-21

## 2024-02-21 RX ORDER — ERGOCALCIFEROL 1.25 MG/1
50000 CAPSULE ORAL WEEKLY
Qty: 12 CAPSULE | Refills: 1 | Status: SHIPPED | OUTPATIENT
Start: 2024-02-21 | End: 2024-05-09

## 2024-02-21 RX ORDER — PHENTERMINE HYDROCHLORIDE 37.5 MG/1
37.5 TABLET ORAL
Qty: 90 TABLET | Refills: 0 | Status: SHIPPED | OUTPATIENT
Start: 2024-02-21 | End: 2024-05-21

## 2024-02-21 NOTE — PROGRESS NOTES
Nannette Nascimento is a 38 year old female.    Chief complaint: annual physical exam       HPI:     Nannette Nascimento is a 38 year old pleasant female who presents for a       No chest pain no sob no abdominal pain  No diarrhea or constipation   No fever or chills   No urinary complaints          No smoking never been a smoker   Alcohol special occasions  Exercise at least 2 or sometimes 3-4   Family hx of cancer : skin cancer : sister   Grandfather : skin cancer       Current Outpatient Medications   Medication Sig Dispense Refill    buPROPion  MG Oral Tablet 24 Hr Take 1 tablet (150 mg total) by mouth in the morning and 1 tablet (150 mg total) before bedtime. 180 tablet 0    Norgestimate-Eth Estradiol (MONO-LINYAH) 0.25-35 MG-MCG Oral Tab Take 1 tablet by mouth daily. 84 tablet 3    ciprofloxacin (CIPRO) 500 MG Oral Tab Take 1 tablet (500 mg total) by mouth As Directed.      Fluocinonide 0.05 % External Solution Apply 1 Application. topically 2 (two) times daily. APPLY TO AFFECTED AREA      Minocycline HCl 100 MG Oral Cap 1 cap(s)      Naltrexone-buPROPion HCl (CONTRAVE OR) Take 1 tablet by mouth daily.      Phentermine HCl 37.5 MG Oral Tab Take 1 tablet (37.5 mg total) by mouth every morning before breakfast. 30 tablet 0    Vitamin D3 25 MCG (1000 UT) Oral Tab Take 1 tablet (1,000 Units total) by mouth daily.      ketoconazole 2 % External Cream APPLY TO AFFECTED AREA EVERY DAY AT NIGHT      Norgestimate-Eth Estradiol 0.25-35 MG-MCG Oral Tab Take one tablet po daily      VITAMIN B-12 1000 MCG Sublingual SL Tab Place 1 tablet under the tongue daily. 90 tablet 0    PHENTERMINE HCL 15 MG Oral Cap take 1 capsule every morning 30 capsule 0      Past Medical History:   Diagnosis Date    Anxiety state, unspecified     Depression     Diverticulosis     Enlargement of lymph nodes     Environmental allergies     Gallstones     Hemorrhoids     Obesity, unspecified     Positive BRENDAN (antinuclear antibody)     Rectal bleeding      Seborrhea     Visual impairment     glasses/contacts     Past Surgical History:   Procedure Laterality Date    COLONOSCOPY  2014    diverticulitis    LAPAROSCOPIC CHOLECYSTECTOMY  06/07/2018             Family History   Problem Relation Age of Onset    Diabetes Maternal Grandfather     Diabetes Paternal Grandmother     Heart Disorder Paternal Grandmother     Heart Disorder Paternal Grandfather     Hypertension Mother     Heart Disorder Maternal Grandmother     Cancer Other     Other (josé luishazel casanova recently) Sister      Patient Active Problem List   Diagnosis    Positive BRENDAN (antinuclear antibody)    Other malaise and fatigue    Seborrhea    Sinusitis    Cervical adenopathy    Morbid obesity (HCC)    S/P laparoscopic cholecystectomy    Scarring alopecia    Right low back pain    Therapeutic drug monitoring    Screening for cervical cancer    Family history of skin cancer    Anxiety and depression       REVIEW OF SYSTEMS:   A comprehensive 10 point review of systems was completed.  Pertinent positives and negatives noted in the the HPI            EXAM:   /70   Pulse 87   Ht 5' 3\" (1.6 m)   Wt 250 lb (113.4 kg)   LMP 05/02/2023 (Approximate)   SpO2 98%   BMI 44.29 kg/m²   GENERAL: well developed, well nourished,in no apparent distress  SKIN: no rashes,no suspicious lesions  HEENT: atraumatic, normocephalic,ears and throat are clear  NECK: supple,no adenopathy  LUNGS: clear to auscultation  CARDIO: RRR without murmur  GI: no masses, HSM or tenderness  EXTREMITIES: no cyanosis, clubbing or edema  NEURO: no gross deficits              No orders of the defined types were placed in this encounter.    No results found.         ASSESSMENT AND PLAN:       ICD-10-CM    1. Annual physical exam  Z00.00 CBC With Differential With Platelet     Comp Metabolic Panel (14)     Lipid Panel     Hemoglobin A1C     TSH W Reflex To Free T4     Vitamin D     Derm Referral - In Network     buPROPion  MG Oral Tablet 24 Hr      Phentermine HCl 37.5 MG Oral Tab     CBC With Differential With Platelet     Comp Metabolic Panel (14)     Lipid Panel     Hemoglobin A1C     TSH W Reflex To Free T4     Vitamin D      2. Encounter for screening mammogram for malignant neoplasm of breast  Z12.31 [facility]ICK 2D+3D SCREENING BILAT (CPT=77067/60956)     Gastro Referral - In Network     CBC With Differential With Platelet     Comp Metabolic Panel (14)     Lipid Panel     Hemoglobin A1C     TSH W Reflex To Free T4     Vitamin D     Derm Referral - In Network     buPROPion  MG Oral Tablet 24 Hr     Phentermine HCl 37.5 MG Oral Tab     CBC With Differential With Platelet     Comp Metabolic Panel (14)     Lipid Panel     Hemoglobin A1C     TSH W Reflex To Free T4     Vitamin D      3. Screening for colon cancer  Z12.11 Gastro Referral - In Network     CBC With Differential With Platelet     Comp Metabolic Panel (14)     Lipid Panel     Hemoglobin A1C     TSH W Reflex To Free T4     Vitamin D     Derm Referral - In Network     buPROPion  MG Oral Tablet 24 Hr     Phentermine HCl 37.5 MG Oral Tab     CBC With Differential With Platelet     Comp Metabolic Panel (14)     Lipid Panel     Hemoglobin A1C     TSH W Reflex To Free T4     Vitamin D      4. Positive BRENDAN (antinuclear antibody)  R76.8       5. Morbid obesity (HCC)  E66.01          Diet and exercise   Self breast exam   Sun screen recommended   Fasting blood work   Up to date with pap smear   Referral to GI   Continue phentermine and wellbutrin   Advised to check with insurance if they cover any GLp1 / GIP meds       Please return to the clinic if you are having persistent symptoms. If worsening symptoms should go to the ER    Leigh Rosado MD,   Diplomate of the American Board of Internal Medicine  Diplomate of the American Board of Obesity Medicine

## 2024-08-21 ENCOUNTER — OFFICE VISIT (OUTPATIENT)
Dept: INTERNAL MEDICINE CLINIC | Facility: CLINIC | Age: 39
End: 2024-08-21
Payer: COMMERCIAL

## 2024-08-21 VITALS
SYSTOLIC BLOOD PRESSURE: 118 MMHG | HEIGHT: 63 IN | DIASTOLIC BLOOD PRESSURE: 86 MMHG | BODY MASS INDEX: 43.2 KG/M2 | OXYGEN SATURATION: 98 % | WEIGHT: 243.81 LBS | HEART RATE: 75 BPM

## 2024-08-21 DIAGNOSIS — E66.01 MORBID OBESITY (HCC): ICD-10-CM

## 2024-08-21 DIAGNOSIS — Z51.81 THERAPEUTIC DRUG MONITORING: Primary | ICD-10-CM

## 2024-08-21 LAB
CONTROL LINE PRESENT WITH A CLEAR BACKGROUND (YES/NO): YES YES/NO
KIT LOT #: NORMAL NUMERIC
PREGNANCY TEST, URINE: NEGATIVE

## 2024-08-21 PROCEDURE — 3008F BODY MASS INDEX DOCD: CPT | Performed by: INTERNAL MEDICINE

## 2024-08-21 PROCEDURE — 99214 OFFICE O/P EST MOD 30 MIN: CPT | Performed by: INTERNAL MEDICINE

## 2024-08-21 PROCEDURE — 81025 URINE PREGNANCY TEST: CPT | Performed by: INTERNAL MEDICINE

## 2024-08-21 PROCEDURE — 3079F DIAST BP 80-89 MM HG: CPT | Performed by: INTERNAL MEDICINE

## 2024-08-21 PROCEDURE — 3074F SYST BP LT 130 MM HG: CPT | Performed by: INTERNAL MEDICINE

## 2024-08-21 RX ORDER — TOPIRAMATE 25 MG/1
25 TABLET, FILM COATED ORAL 2 TIMES DAILY
Qty: 60 TABLET | Refills: 0 | Status: SHIPPED | OUTPATIENT
Start: 2024-08-21 | End: 2024-09-20

## 2024-08-21 RX ORDER — PHENTERMINE HYDROCHLORIDE 37.5 MG/1
37.5 TABLET ORAL
Qty: 30 TABLET | Refills: 2 | Status: SHIPPED | OUTPATIENT
Start: 2024-08-21 | End: 2024-09-20

## 2024-08-21 RX ORDER — NORGESTIMATE AND ETHINYL ESTRADIOL 0.25-0.035
1 KIT ORAL DAILY
Qty: 84 TABLET | Refills: 3 | Status: SHIPPED | OUTPATIENT
Start: 2024-08-21

## 2024-08-21 NOTE — PROGRESS NOTES
Heri Nascimento is a 39 year old female.    Chief complaint:weight loss follow up , medication refill, therapeutic drug monitoring    HPI:   HERI here for follow up on weight loss   Wt Readings from Last 12 Encounters:   08/21/24 243 lb 12.8 oz (110.6 kg)   02/21/24 250 lb (113.4 kg)   12/04/23 252 lb 6.4 oz (114.5 kg)   07/31/23 253 lb 6.4 oz (114.9 kg)   05/09/23 253 lb 9.6 oz (115 kg)   03/28/23 264 lb (119.7 kg)   01/05/23 271 lb 6.4 oz (123.1 kg)   07/24/21 220 lb 12.8 oz (100.2 kg)   06/01/21 228 lb 6.4 oz (103.6 kg)   04/27/21 227 lb 12.8 oz (103.3 kg)   02/15/21 230 lb (104.3 kg)   01/23/21 233 lb (105.7 kg)     Starting weight: 271   Total weight loss: 28 lbs   Medication: has been out of phentermine for a while   Not taking wellbutrin       Typical diet   Breakfast: Lunch: Dinner: Snacks:   1 cup of egg white   Seasoning and tomatoes   Protein drink   Veggies su Salad with chicken in it   [place] wrap    Chicken or steak with some veggies   Half a smaller baked potatoes    Quest bar   Meat and cheese tray   Has been changing in the last couple of months        Has been more proactive in the last couple of weeks     Soda/ juice/ alcohol: alcohol on special occasions     Water intake: inadequate  Exercise: 2 ce per week    Green iced tea unsweetened     Challenges: meal planning    Side effect of medication: NA     Score to self ( how well she is doing ):7-8/10       Current Outpatient Medications   Medication Sig Dispense Refill    Phentermine HCl 37.5 MG Oral Tab Take 1 tablet (37.5 mg total) by mouth every morning before breakfast. 30 tablet 2    Norgestimate-Eth Estradiol (MONO-LINYAH) 0.25-35 MG-MCG Oral Tab Take 1 tablet by mouth daily. 84 tablet 3    topiramate 25 MG Oral Tab Take 1 tablet (25 mg total) by mouth 2 (two) times daily. 60 tablet 0    ciprofloxacin (CIPRO) 500 MG Oral Tab Take 1 tablet (500 mg total) by mouth As Directed.      Fluocinonide 0.05 % External  Solution Apply 1 Application. topically 2 (two) times daily. APPLY TO AFFECTED AREA      Minocycline HCl 100 MG Oral Cap 1 cap(s)      Naltrexone-buPROPion HCl (CONTRAVE OR) Take 1 tablet by mouth daily.      Vitamin D3 25 MCG (1000 UT) Oral Tab Take 1 tablet (1,000 Units total) by mouth daily.      ketoconazole 2 % External Cream APPLY TO AFFECTED AREA EVERY DAY AT NIGHT      Norgestimate-Eth Estradiol 0.25-35 MG-MCG Oral Tab Take one tablet po daily      VITAMIN B-12 1000 MCG Sublingual SL Tab Place 1 tablet under the tongue daily. 90 tablet 0    PHENTERMINE HCL 15 MG Oral Cap take 1 capsule every morning 30 capsule 0      Past Medical History:    Anxiety state, unspecified    Depression    Diverticulosis    Enlargement of lymph nodes    Environmental allergies    Gallstones    Hemorrhoids    Obesity, unspecified    Positive BRENDAN (antinuclear antibody)    Rectal bleeding    Seborrhea    Visual impairment    glasses/contacts     Past Surgical History:   Procedure Laterality Date    Colonoscopy  2014    diverticulitis    Laparoscopic cholecystectomy  06/07/2018        Social History:  Social History     Socioeconomic History    Marital status:    Tobacco Use    Smoking status: Never    Smokeless tobacco: Never   Substance and Sexual Activity    Alcohol use: Not Currently     Alcohol/week: 3.3 standard drinks of alcohol     Types: 4 Glasses of wine per week     Comment: Per pt socially drink on weekends    Drug use: No    Sexual activity: Yes     Birth control/protection: OCP        Family History   Problem Relation Age of Onset    Diabetes Maternal Grandfather     Diabetes Paternal Grandmother     Heart Disorder Paternal Grandmother     Heart Disorder Paternal Grandfather     Hypertension Mother     Heart Disorder Maternal Grandmother     Cancer Other     Other (jordan casanova recently) Sister      Patient Active Problem List   Diagnosis    Positive BRENDAN (antinuclear antibody)    Other malaise and fatigue     Seborrhea    Sinusitis    Cervical adenopathy    Morbid obesity (HCC)    S/P laparoscopic cholecystectomy    Scarring alopecia    Right low back pain    Therapeutic drug monitoring    Screening for cervical cancer    Family history of skin cancer    Anxiety and depression    Annual physical exam    Screening for colon cancer    Encounter for screening mammogram for malignant neoplasm of breast               REVIEW OF SYSTEMS:   A comprehensive 10 point review of systems was completed.  Pertinent positives and negatives noted in the the HPI          PHYSICAL EXAM:   /86   Pulse 75   Ht 5' 3\" (1.6 m)   Wt 243 lb 12.8 oz (110.6 kg)   SpO2 98%   BMI 43.19 kg/m²   GENERAL: well developed, well nourished,in no apparent distress  LUNGS: clear to auscultation  CARDIO: RRR without murmur  NEURO: no gross deficits              Orders Placed This Encounter    POC Urine pregnancy test [[id number]]     Order Specific Question:   Release to patient     Answer:   Immediate    Phentermine HCl 37.5 MG Oral Tab     Sig: Take 1 tablet (37.5 mg total) by mouth every morning before breakfast.     Dispense:  30 tablet     Refill:  2    Norgestimate-Eth Estradiol (MONO-LINYAH) 0.25-35 MG-MCG Oral Tab     Sig: Take 1 tablet by mouth daily.     Dispense:  84 tablet     Refill:  3    topiramate 25 MG Oral Tab     Sig: Take 1 tablet (25 mg total) by mouth 2 (two) times daily.     Dispense:  60 tablet     Refill:  0         ASSESSMENT/PLAN:       ICD-10-CM    1. Therapeutic drug monitoring  Z51.81 Phentermine HCl 37.5 MG Oral Tab     Norgestimate-Eth Estradiol (MONO-LINYAH) 0.25-35 MG-MCG Oral Tab     topiramate 25 MG Oral Tab     POC Urine pregnancy test [[id number]]      2. Morbid obesity (HCC)  E66.01 Phentermine HCl 37.5 MG Oral Tab     Norgestimate-Eth Estradiol (MONO-LINYAH) 0.25-35 MG-MCG Oral Tab     topiramate 25 MG Oral Tab     POC Urine pregnancy test [[id number]]          Plan:  Patient has lost 7 lbs # since LOV Patient has lost a  total weight loss of 28 lbs # since first weight loss consult.   Advised to continue with low carb diet   Goal is less than 50 g per day   Advised to read nutrition labels  Log in carbs if possible   Increase protein intake   exercise goal is 1 hour 3 times per week cardio and strength training   discussed challenges with weight loss   Weigh once a week at least   Avoid sugary drinks or artificially sweetened drinks  Water intake goal is 64 oz per day   Goal weight loss is 12 lbs in 3 months   Advised the importance to continue on weight loss med   Restart phentermine   Add topamax   She is on birth control   Discussed risk of birth defects in babies and needs to stop topamax 2 months before pregnancy planning   Poc pregnancy test today         Plan:  Nutrition: low carb diet   Referral RD/nutritionist : no  Behavior:  Motivational interviewing performed      Discussed strategies to overcome habits/challenges       Reviewed:  Nutrition and the importance of regular protein intake  Labs ordered:    no  Importance of physical activity and reducing sedentary time  Treatment plan   I spent 30 minutes at the day of the service seeing the patient, examination,  completing charting and counseling the patient and/or on coordination of care.  The diagnosis, prognosis, and general treatment was explained to the patient.   Please return to the clinic if you are having persistent or worsening symptoms   Leigh Rosado MD,   Diplomate of the American Board of Internal Medicine  Diplomate of the American Board of Obesity Medicine

## 2025-01-08 ENCOUNTER — OFFICE VISIT (OUTPATIENT)
Dept: INTERNAL MEDICINE CLINIC | Facility: CLINIC | Age: 40
End: 2025-01-08
Payer: COMMERCIAL

## 2025-01-08 VITALS
BODY MASS INDEX: 42.38 KG/M2 | WEIGHT: 239.19 LBS | HEIGHT: 63 IN | DIASTOLIC BLOOD PRESSURE: 84 MMHG | HEART RATE: 83 BPM | OXYGEN SATURATION: 98 % | SYSTOLIC BLOOD PRESSURE: 112 MMHG

## 2025-01-08 DIAGNOSIS — Z51.81 THERAPEUTIC DRUG MONITORING: Primary | ICD-10-CM

## 2025-01-08 DIAGNOSIS — E66.01 MORBID OBESITY (HCC): ICD-10-CM

## 2025-01-08 PROCEDURE — 3074F SYST BP LT 130 MM HG: CPT | Performed by: INTERNAL MEDICINE

## 2025-01-08 PROCEDURE — 99213 OFFICE O/P EST LOW 20 MIN: CPT | Performed by: INTERNAL MEDICINE

## 2025-01-08 PROCEDURE — 3079F DIAST BP 80-89 MM HG: CPT | Performed by: INTERNAL MEDICINE

## 2025-01-08 PROCEDURE — 3008F BODY MASS INDEX DOCD: CPT | Performed by: INTERNAL MEDICINE

## 2025-01-08 RX ORDER — PHENTERMINE HYDROCHLORIDE 37.5 MG/1
37.5 TABLET ORAL
Qty: 90 TABLET | Refills: 0 | Status: SHIPPED | OUTPATIENT
Start: 2025-01-08 | End: 2025-04-08

## 2025-01-08 RX ORDER — NORGESTIMATE AND ETHINYL ESTRADIOL 0.25-0.035
1 KIT ORAL DAILY
Qty: 84 TABLET | Refills: 3 | Status: SHIPPED | OUTPATIENT
Start: 2025-01-08

## 2025-01-08 NOTE — PROGRESS NOTES
Heri Nascimento is a 39 year old female.    Chief complaint:weight loss follow up , medication refill, therapeutic drug monitoring    HPI:   HERI here for follow up on weight loss   Wt Readings from Last 12 Encounters:   01/08/25 239 lb 3.2 oz (108.5 kg)   08/21/24 243 lb 12.8 oz (110.6 kg)   02/21/24 250 lb (113.4 kg)   12/04/23 252 lb 6.4 oz (114.5 kg)   07/31/23 253 lb 6.4 oz (114.9 kg)   05/09/23 253 lb 9.6 oz (115 kg)   03/28/23 264 lb (119.7 kg)   01/05/23 271 lb 6.4 oz (123.1 kg)   07/24/21 220 lb 12.8 oz (100.2 kg)   06/01/21 228 lb 6.4 oz (103.6 kg)   04/27/21 227 lb 12.8 oz (103.3 kg)   02/15/21 230 lb (104.3 kg)     Starting weight: 271 lbs   Total weight loss: 32 lbs   Medication: phentermine ran out couple of months ago    Typical diet   Breakfast: Lunch: Dinner: Snacks:   Cottage cheese with salmon   Sprouted grain bread    Chicken   Scrambled eggs   Spinach    Steak   Mixed veggies   Sweet potatoes    Apples with yogurt and almond butter   Plain yogurt   Turkey stick      Trying to focus on less carbs and more protein     She has a protein drink       Soda/ juice/ alcohol: alcohol on occasion  Water intake: inadequate  Exercise: Yes: has been going to the  2ce per week   Walking     Challenges: getting back on track and getting into the [employer]     Side effect of medication: no   Score to self ( how well she is doing ):7/10             Current Outpatient Medications   Medication Sig Dispense Refill    PHENTERMINE HCL 15 MG Oral Cap take 1 capsule every morning 30 capsule 0    Norgestimate-Eth Estradiol (MONO-LINYAH) 0.25-35 MG-MCG Oral Tab Take 1 tablet by mouth daily. (Patient not taking: Reported on 1/8/2025) 84 tablet 3    ciprofloxacin (CIPRO) 500 MG Oral Tab Take 1 tablet (500 mg total) by mouth As Directed. (Patient not taking: Reported on 1/8/2025)      Fluocinonide 0.05 % External Solution Apply 1 Application. topically 2 (two) times daily. APPLY TO AFFECTED AREA (Patient not taking:  Reported on 1/8/2025)      Minocycline HCl 100 MG Oral Cap 1 cap(s) (Patient not taking: Reported on 1/8/2025)      Naltrexone-buPROPion HCl (CONTRAVE OR) Take 1 tablet by mouth daily. (Patient not taking: Reported on 1/8/2025)      Vitamin D3 25 MCG (1000 UT) Oral Tab Take 1 tablet (1,000 Units total) by mouth daily. (Patient not taking: Reported on 1/8/2025)      ketoconazole 2 % External Cream APPLY TO AFFECTED AREA EVERY DAY AT NIGHT (Patient not taking: Reported on 1/8/2025)      Norgestimate-Eth Estradiol 0.25-35 MG-MCG Oral Tab Take one tablet po daily (Patient not taking: Reported on 1/8/2025)      VITAMIN B-12 1000 MCG Sublingual SL Tab Place 1 tablet under the tongue daily. (Patient not taking: Reported on 1/8/2025) 90 tablet 0      Past Medical History:    Anxiety state, unspecified    Depression    Diverticulosis    Enlargement of lymph nodes    Environmental allergies    Gallstones    Hemorrhoids    Obesity, unspecified    Positive BRENDAN (antinuclear antibody)    Rectal bleeding    Seborrhea    Visual impairment    glasses/contacts     Past Surgical History:   Procedure Laterality Date    Colonoscopy  2014    diverticulitis    Laparoscopic cholecystectomy  06/07/2018        Social History:  Social History     Socioeconomic History    Marital status:    Tobacco Use    Smoking status: Never    Smokeless tobacco: Never   Substance and Sexual Activity    Alcohol use: Not Currently     Alcohol/week: 3.3 standard drinks of alcohol     Types: 4 Glasses of wine per week     Comment: Per pt socially drink on weekends    Drug use: No    Sexual activity: Yes     Birth control/protection: OCP        Family History   Problem Relation Age of Onset    Diabetes Maternal Grandfather     Diabetes Paternal Grandmother     Heart Disorder Paternal Grandmother     Heart Disorder Paternal Grandfather     Hypertension Mother     Heart Disorder Maternal Grandmother     Cancer Other     Other (jordan young)  Sister      Patient Active Problem List   Diagnosis    Positive BRENDAN (antinuclear antibody)    Other malaise and fatigue    Seborrhea    Sinusitis    Cervical adenopathy    Morbid obesity (HCC)    S/P laparoscopic cholecystectomy    Scarring alopecia    Right low back pain    Therapeutic drug monitoring    Screening for cervical cancer    Family history of skin cancer    Anxiety and depression    Annual physical exam    Screening for colon cancer    Encounter for screening mammogram for malignant neoplasm of breast               REVIEW OF SYSTEMS:   A comprehensive 10 point review of systems was completed.  Pertinent positives and negatives noted in the the HPI          PHYSICAL EXAM:   /84   Pulse 83   Ht 5' 3\" (1.6 m)   Wt 239 lb 3.2 oz (108.5 kg)   LMP 12/25/2024 (Approximate)   SpO2 98%   Breastfeeding No   BMI 42.37 kg/m²   GENERAL: well developed, well nourished,in no apparent distress  LUNGS: clear to auscultation  CARDIO: RRR without murmur  NEURO: no gross deficits              No orders of the defined types were placed in this encounter.        ASSESSMENT/PLAN:       ICD-10-CM    1. Therapeutic drug monitoring  Z51.81       2. Morbid obesity (HCC)  E66.01           Plan:  Patient has lost 4 lbs # since LOV. Patient has lost a total weight loss of 31 lbs # since first weight loss consult.  Labs reviewed  Advised to continue with low carb diet   Goal is  g per day  Advised to read nutrition labels  Log in carbs if possible   Increase protein intake   exercise goal is 1 hour 3 times per week cardio and strength training   discussed challenges with weight loss   Weigh once a week at least   Avoid sugary drinks or artificially sweetened drinks  Water intake goal is 64 oz per day   Goal weight loss is 12 lbs in 3 months   Refilled phentermine start with half tab daily x 2 weeks if tolerating well increase to full tab   Discussed zepbound vial cash pay option           Plan:  Nutrition: low carb  diet   Referral RD/nutritionist : no  Behavior:  Motivational interviewing performed      Discussed strategies to overcome habits/challenges       Reviewed:  Nutrition and the importance of regular protein intake  Labs ordered:    no  Treatment plan     Please return to the clinic if you are having persistent or worsening symptoms   Leigh Rosado MD,   Diplomate of the American Board of Internal Medicine  Diplomate of the American Board of Obesity Medicine

## 2025-01-16 ENCOUNTER — TELEPHONE (OUTPATIENT)
Facility: CLINIC | Age: 40
End: 2025-01-16

## 2025-01-16 NOTE — TELEPHONE ENCOUNTER
----- Message from Nasra SIDHU sent at 9/23/2015  3:14 PM CDT -----  Regarding: Recall colon   Recall patient for colon in 10 years per PL.

## 2025-04-09 ENCOUNTER — OFFICE VISIT (OUTPATIENT)
Dept: INTERNAL MEDICINE CLINIC | Facility: CLINIC | Age: 40
End: 2025-04-09
Payer: COMMERCIAL

## 2025-04-09 VITALS
BODY MASS INDEX: 40.04 KG/M2 | DIASTOLIC BLOOD PRESSURE: 76 MMHG | OXYGEN SATURATION: 99 % | HEIGHT: 63 IN | HEART RATE: 86 BPM | SYSTOLIC BLOOD PRESSURE: 120 MMHG | WEIGHT: 226 LBS

## 2025-04-09 DIAGNOSIS — Z51.81 THERAPEUTIC DRUG MONITORING: Primary | ICD-10-CM

## 2025-04-09 DIAGNOSIS — Z12.31 ENCOUNTER FOR SCREENING MAMMOGRAM FOR MALIGNANT NEOPLASM OF BREAST: ICD-10-CM

## 2025-04-09 DIAGNOSIS — Z00.00 ROUTINE GENERAL MEDICAL EXAMINATION AT A HEALTH CARE FACILITY: ICD-10-CM

## 2025-04-09 DIAGNOSIS — E66.01 MORBID OBESITY (HCC): ICD-10-CM

## 2025-04-09 PROCEDURE — 3078F DIAST BP <80 MM HG: CPT | Performed by: INTERNAL MEDICINE

## 2025-04-09 PROCEDURE — 3074F SYST BP LT 130 MM HG: CPT | Performed by: INTERNAL MEDICINE

## 2025-04-09 PROCEDURE — 3008F BODY MASS INDEX DOCD: CPT | Performed by: INTERNAL MEDICINE

## 2025-04-09 PROCEDURE — 99213 OFFICE O/P EST LOW 20 MIN: CPT | Performed by: INTERNAL MEDICINE

## 2025-04-09 RX ORDER — PHENTERMINE HYDROCHLORIDE 37.5 MG/1
37.5 TABLET ORAL
Qty: 90 TABLET | Refills: 0 | Status: SHIPPED | OUTPATIENT
Start: 2025-04-09 | End: 2025-07-08

## 2025-04-09 RX ORDER — TOPIRAMATE 25 MG/1
25 TABLET, FILM COATED ORAL 2 TIMES DAILY
Qty: 60 TABLET | Refills: 2 | Status: SHIPPED | OUTPATIENT
Start: 2025-04-09

## 2025-04-09 NOTE — PROGRESS NOTES
Heri Nascimento is a 40 year old female.    Chief complaint:weight loss follow up , medication refill, therapeutic drug monitoring    HPI:   HERI here for follow up on weight loss   Wt Readings from Last 12 Encounters:   04/09/25 226 lb (102.5 kg)   01/08/25 239 lb 3.2 oz (108.5 kg)   08/21/24 243 lb 12.8 oz (110.6 kg)   02/21/24 250 lb (113.4 kg)   12/04/23 252 lb 6.4 oz (114.5 kg)   07/31/23 253 lb 6.4 oz (114.9 kg)   05/09/23 253 lb 9.6 oz (115 kg)   03/28/23 264 lb (119.7 kg)   01/05/23 271 lb 6.4 oz (123.1 kg)   07/24/21 220 lb 12.8 oz (100.2 kg)   06/01/21 228 lb 6.4 oz (103.6 kg)   04/27/21 227 lb 12.8 oz (103.3 kg)     Starting weight: 271 lbs   Total weight loss: 45 lbs  Medication: phentermine     Typical diet   Breakfast: Lunch: Dinner: Snacks:   Cottage cheese  Chicken and salad   Trying to have less carbs   Protein shake at times    Chicken steak    Turkey liz     Doesn't count carbs and protein       Soda/ juice/ alcohol: alcohol   Water intake: she doesn't drink enough water   Exercise:2 times per week with a  and 1 time by herself  Challenges: social events    Side effect of medication: no       Current Medications[1]   Past Medical History[2]  Past Surgical History[3]     Social History:  Short Social Hx on File[4]   Family History[5]  Problem List[6]            REVIEW OF SYSTEMS:   A comprehensive 10 point review of systems was completed.  Pertinent positives and negatives noted in the the HPI          PHYSICAL EXAM:   /76   Pulse 86   Ht 5' 3\" (1.6 m)   Wt 226 lb (102.5 kg)   LMP 12/25/2024 (Approximate)   SpO2 99%   BMI 40.03 kg/m²   GENERAL: well developed, well nourished,in no apparent distress  LUNGS: clear to auscultation  CARDIO: RRR without murmur  NEURO: no gross deficits              No orders of the defined types were placed in this encounter.        ASSESSMENT/PLAN:       ICD-10-CM    1. Therapeutic drug monitoring  Z51.81 topiramate 25 MG Oral Tab     Phentermine HCl  37.5 MG Oral Tab     CBC With Differential With Platelet     Comp Metabolic Panel (14)     Lipid Panel     Hemoglobin A1C     TSH W Reflex To Free T4      2. Morbid obesity (HCC)  E66.01 topiramate 25 MG Oral Tab     Phentermine HCl 37.5 MG Oral Tab     CBC With Differential With Platelet     Comp Metabolic Panel (14)     Lipid Panel     Hemoglobin A1C     TSH W Reflex To Free T4      3. Routine general medical examination at a health care facility  Z00.00 topiramate 25 MG Oral Tab     Phentermine HCl 37.5 MG Oral Tab     CBC With Differential With Platelet     Comp Metabolic Panel (14)     Lipid Panel     Hemoglobin A1C     TSH W Reflex To Free T4      4. Encounter for screening mammogram for malignant neoplasm of breast  Z12.31 topiramate 25 MG Oral Tab     Phentermine HCl 37.5 MG Oral Tab     CBC With Differential With Platelet     Comp Metabolic Panel (14)     Lipid Panel     Hemoglobin A1C     TSH W Reflex To Free T4     [facility] NICK 2D+3D SCREENING BILAT (CPT=77067/37950)           Plan:  Patient has lost 13 lbs # since LOV. Patient has lost a total weight loss of 45 lbs # since first weight loss consult.  Labs reviewed  Advised to continue with low carb diet   Goal is less than 100 g per day   Advised to read nutrition labels  Log in carbs if possible   Increase protein intake   exercise goal is 1 hour 3 times per week cardio and strength training   discussed challenges with weight loss   Water intake goal is 64 oz per day   Goal weight loss is 11 lbs in 3 months   Continue phentermine  Will add Topamax  No history of kidney stone, discussed the risk of birth defects in babies advised not to get pregnant on Topamax and to stop it 2 months before planning for pregnancy  Consider Zepbound viral if she does not lose weight on above regimen  Mammogram screening before next annual physical exam visit       Plan:  Nutrition: low carb diet   Referral RD/nutritionist : no  Behavior:  Motivational interviewing performed       Discussed strategies to overcome habits/challenges       Reviewed:  Nutrition and the importance of regular protein intake  Labs ordered:    yes to complete before next annual physical exam     Treatment plan     Please return to the clinic if you are having persistent or worsening symptoms   Leigh Rosado MD,   Diplomate of the American Board of Internal Medicine  Diplomate of the American Board of Obesity Medicine            [1]   Current Outpatient Medications   Medication Sig Dispense Refill    Norgestimate-Eth Estradiol (MONO-LINYAH) 0.25-35 MG-MCG Oral Tab Take 1 tablet by mouth daily. 84 tablet 3    ciprofloxacin (CIPRO) 500 MG Oral Tab Take 1 tablet (500 mg total) by mouth As Directed. (Patient not taking: Reported on 1/8/2025)      Fluocinonide 0.05 % External Solution Apply 1 Application. topically 2 (two) times daily. APPLY TO AFFECTED AREA (Patient not taking: Reported on 1/8/2025)      Minocycline HCl 100 MG Oral Cap 1 cap(s) (Patient not taking: Reported on 1/8/2025)      Naltrexone-buPROPion HCl (CONTRAVE OR) Take 1 tablet by mouth daily. (Patient not taking: Reported on 1/8/2025)      Vitamin D3 25 MCG (1000 UT) Oral Tab Take 1 tablet (1,000 Units total) by mouth daily. (Patient not taking: Reported on 1/8/2025)      ketoconazole 2 % External Cream APPLY TO AFFECTED AREA EVERY DAY AT NIGHT (Patient not taking: Reported on 1/8/2025)      Norgestimate-Eth Estradiol 0.25-35 MG-MCG Oral Tab Take one tablet po daily (Patient not taking: Reported on 1/8/2025)      VITAMIN B-12 1000 MCG Sublingual SL Tab Place 1 tablet under the tongue daily. (Patient not taking: Reported on 1/8/2025) 90 tablet 0    PHENTERMINE HCL 15 MG Oral Cap take 1 capsule every morning 30 capsule 0   [2]   Past Medical History:   Anxiety state, unspecified    Depression    Diverticulosis    Enlargement of lymph nodes    Environmental allergies    Gallstones    Hemorrhoids    Obesity, unspecified    Positive BRENDAN (antinuclear  antibody)    Rectal bleeding    Seborrhea    Visual impairment    glasses/contacts   [3]   Past Surgical History:  Procedure Laterality Date    Colonoscopy  2014    diverticulitis    Laparoscopic cholecystectomy  06/07/2018   [4]   Social History  Socioeconomic History    Marital status:    Tobacco Use    Smoking status: Never    Smokeless tobacco: Never   Substance and Sexual Activity    Alcohol use: Not Currently     Alcohol/week: 3.3 standard drinks of alcohol     Types: 4 Glasses of wine per week     Comment: Per pt socially drink on weekends    Drug use: No    Sexual activity: Yes     Birth control/protection: OCP   [5]   Family History  Problem Relation Age of Onset    Diabetes Maternal Grandfather     Diabetes Paternal Grandmother     Heart Disorder Paternal Grandmother     Heart Disorder Paternal Grandfather     Hypertension Mother     Heart Disorder Maternal Grandmother     Cancer Other     Other (jordan casanova recently) Sister    [6]   Patient Active Problem List  Diagnosis    Positive BRENDAN (antinuclear antibody)    Other malaise and fatigue    Seborrhea    Sinusitis    Cervical adenopathy    Morbid obesity (HCC)    S/P laparoscopic cholecystectomy    Scarring alopecia    Right low back pain    Therapeutic drug monitoring    Screening for cervical cancer    Family history of skin cancer    Anxiety and depression    Annual physical exam    Screening for colon cancer    Encounter for screening mammogram for malignant neoplasm of breast

## 2025-06-24 ENCOUNTER — OFFICE VISIT (OUTPATIENT)
Dept: INTERNAL MEDICINE CLINIC | Facility: CLINIC | Age: 40
End: 2025-06-24
Payer: COMMERCIAL

## 2025-06-24 ENCOUNTER — TELEPHONE (OUTPATIENT)
Dept: INTERNAL MEDICINE CLINIC | Facility: CLINIC | Age: 40
End: 2025-06-24

## 2025-06-24 VITALS
WEIGHT: 217.38 LBS | OXYGEN SATURATION: 96 % | DIASTOLIC BLOOD PRESSURE: 60 MMHG | SYSTOLIC BLOOD PRESSURE: 102 MMHG | HEIGHT: 63 IN | HEART RATE: 84 BPM | BODY MASS INDEX: 38.52 KG/M2

## 2025-06-24 DIAGNOSIS — N64.59 ABNORMAL BREAST EXAM: ICD-10-CM

## 2025-06-24 DIAGNOSIS — Z51.81 THERAPEUTIC DRUG MONITORING: ICD-10-CM

## 2025-06-24 DIAGNOSIS — Z12.11 SCREENING FOR COLON CANCER: ICD-10-CM

## 2025-06-24 DIAGNOSIS — R21 RASH: ICD-10-CM

## 2025-06-24 DIAGNOSIS — R92.30 BREAST DENSITY: ICD-10-CM

## 2025-06-24 DIAGNOSIS — R92.30 BREAST DENSITY: Primary | ICD-10-CM

## 2025-06-24 DIAGNOSIS — Z00.00 ANNUAL PHYSICAL EXAM: Primary | ICD-10-CM

## 2025-06-24 PROCEDURE — 99396 PREV VISIT EST AGE 40-64: CPT | Performed by: INTERNAL MEDICINE

## 2025-06-24 PROCEDURE — 3074F SYST BP LT 130 MM HG: CPT | Performed by: INTERNAL MEDICINE

## 2025-06-24 PROCEDURE — 3078F DIAST BP <80 MM HG: CPT | Performed by: INTERNAL MEDICINE

## 2025-06-24 PROCEDURE — 3008F BODY MASS INDEX DOCD: CPT | Performed by: INTERNAL MEDICINE

## 2025-06-24 RX ORDER — PHENTERMINE HYDROCHLORIDE 37.5 MG/1
37.5 TABLET ORAL
Qty: 90 TABLET | Refills: 0 | Status: SHIPPED | OUTPATIENT
Start: 2025-06-24 | End: 2025-09-22

## 2025-06-24 RX ORDER — CETIRIZINE HYDROCHLORIDE 10 MG/1
10 TABLET ORAL DAILY
Qty: 30 TABLET | Refills: 0 | Status: SHIPPED | OUTPATIENT
Start: 2025-06-24 | End: 2025-07-24

## 2025-06-24 RX ORDER — TRIAMCINOLONE ACETONIDE 1 MG/G
1 CREAM TOPICAL 2 TIMES DAILY PRN
Qty: 15 G | Refills: 0 | Status: SHIPPED | OUTPATIENT
Start: 2025-06-24

## 2025-06-24 RX ORDER — TOPIRAMATE 50 MG/1
50 TABLET, FILM COATED ORAL 2 TIMES DAILY
Qty: 180 TABLET | Refills: 0 | Status: SHIPPED | OUTPATIENT
Start: 2025-06-24

## 2025-06-24 NOTE — PROGRESS NOTES
Nannette Nascimento is a 40 year old female.    Chief complaint: annual physical exam       HPI:     Nannette Nascimento is a 40 year old female who presents for annual physical exam   No chest pain no sob no abdominal pain  No diarrhea or constipation   No fever or chills   No urinary complaints          Rash around her neck area   Started 1 week ago   Getting better   Was really itchy   Cannot think of anything new   She has a convertible car   Did use OTC hydrocortisone stick   Slightly better       No smoking   Never been  a smoker   Alcohol occasional   Exercise :2-4 times per week   Family history of cancer : skin cancer GF         Current Medications[1]   Past Medical History[2]  Past Surgical History[3]        Family History[4]  Problem List[5]    REVIEW OF SYSTEMS:   A comprehensive 10 point review of systems was completed.  Pertinent positives and negatives noted in the the HPI            EXAM:   /60   Pulse 84   Ht 5' 3\" (1.6 m)   Wt 217 lb 6.4 oz (98.6 kg)   LMP 12/25/2024 (Approximate)   SpO2 96%   BMI 38.51 kg/m²   GENERAL: well developed, well nourished,in no apparent distress  SKIN: Rash around the neck area, erythema     HEENT: atraumatic, normocephalic,ears and throat are clear  NECK: supple,no adenopathy  LUNGS: clear to auscultation  Breast : left breast with more palpable density / cannot rule out lump at 1 o'clock     CARDIO: RRR without murmur  GI: no masses, HSM or tenderness  EXTREMITIES: no cyanosis, clubbing or edema  NEURO: no gross deficits              No orders of the defined types were placed in this encounter.    No results found.         ASSESSMENT AND PLAN:       ICD-10-CM    1. Annual physical exam  Z00.00 Gastro Referral - In Network     topiramate (TOPAMAX) 50 MG Oral Tab     Phentermine HCl 37.5 MG Oral Tab     [facility] NICK 2D+3D DIAGNOSTIC [facility]  BILAT (PMV=12326/20798)     triamcinolone 0.1 % External Cream     cetirizine 10 MG Oral Tab      2. Screening for colon cancer  Z12.11  Gastro Referral - In Network     topiramate (TOPAMAX) 50 MG Oral Tab     Phentermine HCl 37.5 MG Oral Tab     [facility] NICK 2D+3D DIAGNOSTIC [facility]  BILAT (QVV=90996/67788)     triamcinolone 0.1 % External Cream     cetirizine 10 MG Oral Tab      3. Breast density  R92.30 Gastro Referral - In Network     topiramate (TOPAMAX) 50 MG Oral Tab     Phentermine HCl 37.5 MG Oral Tab     [facility] NICK 2D+3D DIAGNOSTIC [facility]  BILAT (DFA=00919/30007)     triamcinolone 0.1 % External Cream     cetirizine 10 MG Oral Tab      4. Therapeutic drug monitoring  Z51.81 Gastro Referral - In Network     topiramate (TOPAMAX) 50 MG Oral Tab     Phentermine HCl 37.5 MG Oral Tab     [facility] NICK 2D+3D DIAGNOSTIC [facility]  BILAT (SZN=[id number]/50391)     triamcinolone 0.1 % External Cream     cetirizine 10 MG Oral Tab      5. Rash  R21 Gastro Referral - In Network     topiramate (TOPAMAX) 50 MG Oral Tab     Phentermine HCl 37.5 MG Oral Tab     [facility] NICK 2D+3D DIAGNOSTIC [facility]  BILAT (SZA=14794/53495)     triamcinolone 0.1 % External Cream     cetirizine 10 MG Oral Tab      6. Abnormal breast exam  N64.59 Gastro Referral - In Network     topiramate (TOPAMAX) 50 MG Oral Tab     Phentermine HCl 37.5 MG Oral Tab     [facility] NICK 2D+3D DIAGNOSTIC [facility]  BILAT (RRF=32452/56602)     triamcinolone 0.1 % External Cream     cetirizine 10 MG Oral Tab       Diet and exercise   Self breast exam   Sun screen recommended   Fasting blood work   Up to date with Pap smear  Advised to increase Topamax to 50 mg twice a day  Discussed the importance of using the birth control discussed the risk of birth defects with Topamax  Continue phentermine  Referral to gastro for screening colonoscopy  Advised to use triamcinolone topical for the rash and Zyrtec  If she continues to have a rash after to see a dermatologist  Mammogram diagnostic   Please return to the clinic if you are having persistent symptoms. If worsening symptoms should go to the ER    Leigh Rosado MD,   Diplomate of the American Board  of Internal Medicine  Diplomate of the American Board of Obesity Medicine          [1]   Current Outpatient Medications   Medication Sig Dispense Refill    topiramate 25 MG Oral Tab Take 1 tablet (25 mg total) by mouth 2 (two) times daily. 60 tablet 2    Phentermine HCl 37.5 MG Oral Tab Take 1 tablet (37.5 mg total) by mouth every morning before breakfast. 90 tablet 0    Norgestimate-Eth Estradiol (MONO-LINYAH) 0.25-35 MG-MCG Oral Tab Take 1 tablet by mouth daily. 84 tablet 3    ciprofloxacin (CIPRO) 500 MG Oral Tab Take 1 tablet (500 mg total) by mouth As Directed. (Patient not taking: Reported on 6/24/2025)      Fluocinonide 0.05 % External Solution Apply 1 Application. topically 2 (two) times daily. APPLY TO AFFECTED AREA (Patient not taking: Reported on 6/24/2025)      Minocycline HCl 100 MG Oral Cap 1 cap(s) (Patient not taking: Reported on 6/24/2025)      Naltrexone-buPROPion HCl (CONTRAVE OR) Take 1 tablet by mouth daily. (Patient not taking: Reported on 6/24/2025)      Vitamin D3 25 MCG (1000 UT) Oral Tab Take 1 tablet (1,000 Units total) by mouth daily. (Patient not taking: Reported on 6/24/2025)      ketoconazole 2 % External Cream APPLY TO AFFECTED AREA EVERY DAY AT NIGHT (Patient not taking: Reported on 6/24/2025)      Norgestimate-Eth Estradiol 0.25-35 MG-MCG Oral Tab Take one tablet po daily (Patient not taking: Reported on 6/24/2025)      VITAMIN B-12 1000 MCG Sublingual SL Tab Place 1 tablet under the tongue daily. (Patient not taking: Reported on 6/24/2025) 90 tablet 0    PHENTERMINE HCL 15 MG Oral Cap take 1 capsule every morning (Patient not taking: Reported on 6/24/2025) 30 capsule 0   [2]   Past Medical History:   Anxiety state, unspecified    Depression    Diverticulosis    Enlargement of lymph nodes    Environmental allergies    Gallstones    Hemorrhoids    Obesity, unspecified    Positive BRENDAN (antinuclear antibody)    Rectal bleeding    Seborrhea    Visual impairment    glasses/contacts   [3]    Past Surgical History:  Procedure Laterality Date    Colonoscopy  2014    diverticulitis    Laparoscopic cholecystectomy  06/07/2018   [4]   Family History  Problem Relation Age of Onset    Diabetes Maternal Grandfather     Diabetes Paternal Grandmother     Heart Disorder Paternal Grandmother     Heart Disorder Paternal Grandfather     Hypertension Mother     Heart Disorder Maternal Grandmother     Cancer Other     Other (jordan casanova recently) Sister    [5]   Patient Active Problem List  Diagnosis    Positive BRENDAN (antinuclear antibody)    Other malaise and fatigue    Seborrhea    Sinusitis    Cervical adenopathy    Morbid obesity (HCC)    S/P laparoscopic cholecystectomy    Scarring alopecia    Right low back pain    Therapeutic drug monitoring    Screening for cervical cancer    Family history of skin cancer    Anxiety and depression    Annual physical exam    Screening for colon cancer    Encounter for screening mammogram for malignant neoplasm of breast

## 2025-06-24 NOTE — TELEPHONE ENCOUNTER
We received a call from central scheduling and they need to have the order changed to Breast Density only.

## 2025-06-25 ENCOUNTER — LAB ENCOUNTER (OUTPATIENT)
Dept: LAB | Facility: REFERENCE LAB | Age: 40
End: 2025-06-25
Attending: INTERNAL MEDICINE
Payer: COMMERCIAL

## 2025-06-25 DIAGNOSIS — Z12.31 ENCOUNTER FOR SCREENING MAMMOGRAM FOR MALIGNANT NEOPLASM OF BREAST: ICD-10-CM

## 2025-06-25 DIAGNOSIS — Z51.81 THERAPEUTIC DRUG MONITORING: ICD-10-CM

## 2025-06-25 DIAGNOSIS — Z00.00 ROUTINE GENERAL MEDICAL EXAMINATION AT A HEALTH CARE FACILITY: ICD-10-CM

## 2025-06-25 DIAGNOSIS — E66.01 MORBID OBESITY (HCC): ICD-10-CM

## 2025-06-25 LAB
ALBUMIN SERPL-MCNC: 4.6 G/DL (ref 3.2–4.8)
ALBUMIN/GLOB SERPL: 2.3 {RATIO} (ref 1–2)
ALP LIVER SERPL-CCNC: 54 U/L (ref 37–98)
ALT SERPL-CCNC: 19 U/L (ref 10–49)
ANION GAP SERPL CALC-SCNC: 6 MMOL/L (ref 0–18)
AST SERPL-CCNC: 19 U/L (ref ?–34)
BASOPHILS # BLD AUTO: 0.04 X10(3) UL (ref 0–0.2)
BASOPHILS NFR BLD AUTO: 0.7 %
BILIRUB SERPL-MCNC: 0.5 MG/DL (ref 0.3–1.2)
BUN BLD-MCNC: 10 MG/DL (ref 9–23)
BUN/CREAT SERPL: 13.5 (ref 10–20)
CALCIUM BLD-MCNC: 9 MG/DL (ref 8.7–10.4)
CHLORIDE SERPL-SCNC: 105 MMOL/L (ref 98–112)
CHOLEST SERPL-MCNC: 177 MG/DL (ref ?–200)
CO2 SERPL-SCNC: 29 MMOL/L (ref 21–32)
CREAT BLD-MCNC: 0.74 MG/DL (ref 0.55–1.02)
DEPRECATED RDW RBC AUTO: 45.7 FL (ref 35.1–46.3)
EGFRCR SERPLBLD CKD-EPI 2021: 104 ML/MIN/1.73M2 (ref 60–?)
EOSINOPHIL # BLD AUTO: 0.05 X10(3) UL (ref 0–0.7)
EOSINOPHIL NFR BLD AUTO: 0.9 %
ERYTHROCYTE [DISTWIDTH] IN BLOOD BY AUTOMATED COUNT: 13.4 % (ref 11–15)
EST. AVERAGE GLUCOSE BLD GHB EST-MCNC: 100 MG/DL (ref 68–126)
FASTING PATIENT LIPID ANSWER: YES
FASTING STATUS PATIENT QL REPORTED: YES
GLOBULIN PLAS-MCNC: 2 G/DL (ref 2–3.5)
GLUCOSE BLD-MCNC: 78 MG/DL (ref 70–99)
HBA1C MFR BLD: 5.1 % (ref ?–5.7)
HCT VFR BLD AUTO: 42 % (ref 35–48)
HDLC SERPL-MCNC: 70 MG/DL (ref 40–59)
HGB BLD-MCNC: 13.9 G/DL (ref 12–16)
IMM GRANULOCYTES # BLD AUTO: 0.01 X10(3) UL (ref 0–1)
IMM GRANULOCYTES NFR BLD: 0.2 %
LDLC SERPL CALC-MCNC: 94 MG/DL (ref ?–100)
LYMPHOCYTES # BLD AUTO: 1.72 X10(3) UL (ref 1–4)
LYMPHOCYTES NFR BLD AUTO: 32.2 %
MCH RBC QN AUTO: 31 PG (ref 26–34)
MCHC RBC AUTO-ENTMCNC: 33.1 G/DL (ref 31–37)
MCV RBC AUTO: 93.5 FL (ref 80–100)
MONOCYTES # BLD AUTO: 0.34 X10(3) UL (ref 0.1–1)
MONOCYTES NFR BLD AUTO: 6.4 %
NEUTROPHILS # BLD AUTO: 3.18 X10 (3) UL (ref 1.5–7.7)
NEUTROPHILS # BLD AUTO: 3.18 X10(3) UL (ref 1.5–7.7)
NEUTROPHILS NFR BLD AUTO: 59.6 %
NONHDLC SERPL-MCNC: 107 MG/DL (ref ?–130)
OSMOLALITY SERPL CALC.SUM OF ELEC: 288 MOSM/KG (ref 275–295)
PLATELET # BLD AUTO: 199 10(3)UL (ref 150–450)
POTASSIUM SERPL-SCNC: 4.4 MMOL/L (ref 3.5–5.1)
PROT SERPL-MCNC: 6.6 G/DL (ref 5.7–8.2)
RBC # BLD AUTO: 4.49 X10(6)UL (ref 3.8–5.3)
SODIUM SERPL-SCNC: 140 MMOL/L (ref 136–145)
TRIGL SERPL-MCNC: 67 MG/DL (ref 30–149)
TSI SER-ACNC: 2.19 UIU/ML (ref 0.55–4.78)
VLDLC SERPL CALC-MCNC: 11 MG/DL (ref 0–30)
WBC # BLD AUTO: 5.3 X10(3) UL (ref 4–11)

## 2025-06-25 PROCEDURE — 80061 LIPID PANEL: CPT | Performed by: INTERNAL MEDICINE

## 2025-06-25 PROCEDURE — 83036 HEMOGLOBIN GLYCOSYLATED A1C: CPT | Performed by: INTERNAL MEDICINE

## 2025-06-25 PROCEDURE — 80050 GENERAL HEALTH PANEL: CPT | Performed by: INTERNAL MEDICINE

## 2025-06-25 NOTE — TELEPHONE ENCOUNTER
She does have breast density / lump left breast  We need to keep it as diagnostic mammogram.  Please inform radiology

## (undated) DEVICE — LAP CHOLE: Brand: MEDLINE INDUSTRIES, INC.

## (undated) DEVICE — TISSUE RETRIEVAL SYSTEM: Brand: INZII RETRIEVAL SYSTEM

## (undated) DEVICE — SUTURE MONOCRYL 4-0 Y845G

## (undated) DEVICE — TROCAR: Brand: KII FIOS FIRST ENTRY

## (undated) DEVICE — TROCAR: Brand: KII® SLEEVE

## (undated) DEVICE — HOVERMATT 34IN SINGLE USE

## (undated) DEVICE — CAUTERY BLADE 2IN INS E1455

## (undated) DEVICE — [HIGH FLOW INSUFFLATOR,  DO NOT USE IF PACKAGE IS DAMAGED,  KEEP DRY,  KEEP AWAY FROM SUNLIGHT,  PROTECT FROM HEAT AND RADIOACTIVE SOURCES.]: Brand: PNEUMOSURE

## (undated) DEVICE — DISPOSABLE LAPAROSCOPIC CLIP APPLIER WITH 20 CLIPS.: Brand: EPIX® UNIVERSAL CLIP APPLIER

## (undated) DEVICE — ABDOMINAL BINDER: Brand: DEROYAL

## (undated) DEVICE — SOL  .9 1000ML BTL

## (undated) DEVICE — SUTURE VICRYL 0 UR-6

## (undated) DEVICE — GLOVE SRG BIOGEL 8.5

## (undated) DEVICE — SOL  .9 1000ML BAG

## (undated) NOTE — Clinical Note
Pt has had recurrent mouth sores that have been Dx as viral. Pt has tongue tenderness and superficial irritations at mid cheek bite lines with sore throat. No GERD. I had not discussed Nutritional or Vitamin intake vs deficiencies but wondered later? ?  Sent

## (undated) NOTE — LETTER
5/23/2018          To Whom It May Concern:    Larry Waters is currently under my medical care is scheduled for a surgical procedure on 06/07/2018. If you require additional information please contact our office.         Sincerely,    Loren Darnell MD

## (undated) NOTE — LETTER
1/16/2025    Nannette Nascimento        [address] [facility] [facility] [address]            Dear Nannette Nascimento,      Our records indicate that you are due for an appointment for a Colonoscopy with Rudy Hays MD. Our doctors are booking out about 3-6 months in advance for procedures.     Please call our office to schedule this appointment.  Your medical well-being is important to us.    If your insurance requires a referral, please call your primary care office to request one.      Thank you,      The Physicians and Staff at [facility]

## (undated) NOTE — LETTER
Cher Lepe, [address] [facility] [address] [facility],[address], [facility]       05/23/18        Patient: Jeremy Abbott   YOB: 1985   Date of Visit: 5/23/2018       Dear  Dr. Susan Alvarado MD,      Thank you for referring Jeremy Abbott to my practice.   Please find my ass

## (undated) NOTE — LETTER
06/22/18        Patient: Tapan Boston   YOB: 1985   Date of Visit: 6/22/2018       Dear  Dr. Per Alvarado MD,      Thank you for referring Tapan Boston to my practice. Please find my assessment and plan below.          S/P laparosc

## (undated) NOTE — MR AVS SNAPSHOT
[facility] [address]  [address] [place] [address] Road  [phone]               Thank you for choosing us for your health care visit with Mira Kocher, APN.   We are glad to serve you and happy to provide you with this summary of your visit What changed:  Another medication with the same name was added. Make sure you understand how and when to take each.    Commonly known as:  BACTROBAN           * mupirocin 2 % Oint   Apply to lesion(s)/ inside nose 3 times daily for 14 days   What changed: [employer] [address] [place], [place] [address] [address] for making healthy food choices  -   Enjoy your food, but eat less. Fully enjoy your food when eating. Don’t eat while distracted and slow down. Avoid over sized portions. Don’t eat while when you’re bored.